# Patient Record
Sex: FEMALE | Race: WHITE | Employment: OTHER | URBAN - METROPOLITAN AREA
[De-identification: names, ages, dates, MRNs, and addresses within clinical notes are randomized per-mention and may not be internally consistent; named-entity substitution may affect disease eponyms.]

---

## 2017-02-06 PROBLEM — J44.89 ASTHMA WITH COPD (CHRONIC OBSTRUCTIVE PULMONARY DISEASE): Status: ACTIVE | Noted: 2017-02-06

## 2017-02-06 PROBLEM — J44.9 ASTHMA WITH COPD (CHRONIC OBSTRUCTIVE PULMONARY DISEASE) (HCC): Status: ACTIVE | Noted: 2017-02-06

## 2018-05-14 ENCOUNTER — OFFICE VISIT (OUTPATIENT)
Dept: FAMILY MEDICINE CLINIC | Age: 58
End: 2018-05-14
Payer: COMMERCIAL

## 2018-05-14 VITALS
BODY MASS INDEX: 35.26 KG/M2 | DIASTOLIC BLOOD PRESSURE: 61 MMHG | OXYGEN SATURATION: 98 % | SYSTOLIC BLOOD PRESSURE: 121 MMHG | HEART RATE: 57 BPM | WEIGHT: 199 LBS | HEIGHT: 63 IN | RESPIRATION RATE: 18 BRPM

## 2018-05-14 DIAGNOSIS — H91.90 HEARING LOSS, UNSPECIFIED HEARING LOSS TYPE, UNSPECIFIED LATERALITY: ICD-10-CM

## 2018-05-14 DIAGNOSIS — M54.41 ACUTE BILATERAL LOW BACK PAIN WITH BILATERAL SCIATICA: Primary | ICD-10-CM

## 2018-05-14 DIAGNOSIS — F40.298 FEAR OF NEEDLES: ICD-10-CM

## 2018-05-14 DIAGNOSIS — M62.830 BACK SPASM: ICD-10-CM

## 2018-05-14 DIAGNOSIS — M54.42 ACUTE BILATERAL LOW BACK PAIN WITH BILATERAL SCIATICA: Primary | ICD-10-CM

## 2018-05-14 PROCEDURE — G8417 CALC BMI ABV UP PARAM F/U: HCPCS | Performed by: FAMILY MEDICINE

## 2018-05-14 PROCEDURE — 99213 OFFICE O/P EST LOW 20 MIN: CPT | Performed by: FAMILY MEDICINE

## 2018-05-14 PROCEDURE — G8427 DOCREV CUR MEDS BY ELIG CLIN: HCPCS | Performed by: FAMILY MEDICINE

## 2018-05-14 PROCEDURE — 3017F COLORECTAL CA SCREEN DOC REV: CPT | Performed by: FAMILY MEDICINE

## 2018-05-14 PROCEDURE — 4004F PT TOBACCO SCREEN RCVD TLK: CPT | Performed by: FAMILY MEDICINE

## 2018-05-14 RX ORDER — BACLOFEN 20 MG/1
20 TABLET ORAL NIGHTLY PRN
Qty: 45 TABLET | Refills: 0 | Status: SHIPPED | OUTPATIENT
Start: 2018-05-14 | End: 2018-05-14 | Stop reason: SDUPTHER

## 2018-05-14 RX ORDER — BACLOFEN 20 MG/1
TABLET ORAL
Qty: 90 TABLET | Refills: 1 | Status: SHIPPED | OUTPATIENT
Start: 2018-05-14 | End: 2019-02-15 | Stop reason: SDUPTHER

## 2018-05-14 ASSESSMENT — ENCOUNTER SYMPTOMS
SINUS PRESSURE: 0
WHEEZING: 0
SHORTNESS OF BREATH: 0
ABDOMINAL PAIN: 0

## 2018-06-11 ENCOUNTER — HOSPITAL ENCOUNTER (OUTPATIENT)
Dept: GENERAL RADIOLOGY | Age: 58
Discharge: HOME | End: 2018-06-13
Payer: COMMERCIAL

## 2018-06-11 ENCOUNTER — HOSPITAL ENCOUNTER (OUTPATIENT)
Dept: AUDIOLOGY | Age: 58
Discharge: HOME | End: 2018-06-11
Payer: COMMERCIAL

## 2018-06-11 ENCOUNTER — HOSPITAL ENCOUNTER (OUTPATIENT)
Age: 58
Discharge: HOME | End: 2018-06-13
Payer: COMMERCIAL

## 2018-06-11 DIAGNOSIS — M54.41 ACUTE BILATERAL LOW BACK PAIN WITH BILATERAL SCIATICA: ICD-10-CM

## 2018-06-11 DIAGNOSIS — M54.42 ACUTE BILATERAL LOW BACK PAIN WITH BILATERAL SCIATICA: ICD-10-CM

## 2018-06-11 DIAGNOSIS — M62.830 BACK SPASM: ICD-10-CM

## 2018-06-11 PROCEDURE — 72110 X-RAY EXAM L-2 SPINE 4/>VWS: CPT

## 2018-06-11 PROCEDURE — 92557 COMPREHENSIVE HEARING TEST: CPT | Performed by: AUDIOLOGIST

## 2018-06-11 PROCEDURE — 92567 TYMPANOMETRY: CPT | Performed by: AUDIOLOGIST

## 2018-06-12 ENCOUNTER — TELEPHONE (OUTPATIENT)
Dept: FAMILY MEDICINE CLINIC | Age: 58
End: 2018-06-12

## 2018-06-25 ENCOUNTER — OFFICE VISIT (OUTPATIENT)
Dept: FAMILY MEDICINE CLINIC | Age: 58
End: 2018-06-25
Payer: COMMERCIAL

## 2018-06-25 VITALS
OXYGEN SATURATION: 98 % | HEIGHT: 63 IN | WEIGHT: 199 LBS | HEART RATE: 76 BPM | SYSTOLIC BLOOD PRESSURE: 110 MMHG | BODY MASS INDEX: 35.26 KG/M2 | RESPIRATION RATE: 18 BRPM | DIASTOLIC BLOOD PRESSURE: 70 MMHG

## 2018-06-25 DIAGNOSIS — J44.9 ASTHMA WITH COPD (CHRONIC OBSTRUCTIVE PULMONARY DISEASE) (HCC): ICD-10-CM

## 2018-06-25 DIAGNOSIS — J01.00 ACUTE NON-RECURRENT MAXILLARY SINUSITIS: ICD-10-CM

## 2018-06-25 DIAGNOSIS — M47.26 OSTEOARTHRITIS OF SPINE WITH RADICULOPATHY, LUMBAR REGION: Primary | ICD-10-CM

## 2018-06-25 DIAGNOSIS — F40.298 FEAR OF NEEDLES: ICD-10-CM

## 2018-06-25 PROCEDURE — G8417 CALC BMI ABV UP PARAM F/U: HCPCS | Performed by: FAMILY MEDICINE

## 2018-06-25 PROCEDURE — 99214 OFFICE O/P EST MOD 30 MIN: CPT | Performed by: FAMILY MEDICINE

## 2018-06-25 PROCEDURE — 4004F PT TOBACCO SCREEN RCVD TLK: CPT | Performed by: FAMILY MEDICINE

## 2018-06-25 PROCEDURE — G8427 DOCREV CUR MEDS BY ELIG CLIN: HCPCS | Performed by: FAMILY MEDICINE

## 2018-06-25 PROCEDURE — G8926 SPIRO NO PERF OR DOC: HCPCS | Performed by: FAMILY MEDICINE

## 2018-06-25 PROCEDURE — 3023F SPIROM DOC REV: CPT | Performed by: FAMILY MEDICINE

## 2018-06-25 PROCEDURE — 3017F COLORECTAL CA SCREEN DOC REV: CPT | Performed by: FAMILY MEDICINE

## 2018-06-25 RX ORDER — AMOXICILLIN AND CLAVULANATE POTASSIUM 875; 125 MG/1; MG/1
1 TABLET, FILM COATED ORAL 2 TIMES DAILY WITH MEALS
Qty: 20 TABLET | Refills: 0 | Status: SHIPPED | OUTPATIENT
Start: 2018-06-25 | End: 2018-07-05

## 2018-06-25 ASSESSMENT — PATIENT HEALTH QUESTIONNAIRE - PHQ9
1. LITTLE INTEREST OR PLEASURE IN DOING THINGS: 0
2. FEELING DOWN, DEPRESSED OR HOPELESS: 0
SUM OF ALL RESPONSES TO PHQ QUESTIONS 1-9: 0
SUM OF ALL RESPONSES TO PHQ9 QUESTIONS 1 & 2: 0

## 2018-06-25 ASSESSMENT — ENCOUNTER SYMPTOMS
WHEEZING: 0
SHORTNESS OF BREATH: 0
SINUS PRESSURE: 0
ABDOMINAL PAIN: 0

## 2018-07-17 ENCOUNTER — OFFICE VISIT (OUTPATIENT)
Dept: FAMILY MEDICINE CLINIC | Age: 58
End: 2018-07-17
Payer: COMMERCIAL

## 2018-07-17 VITALS
HEART RATE: 80 BPM | WEIGHT: 204 LBS | SYSTOLIC BLOOD PRESSURE: 112 MMHG | RESPIRATION RATE: 18 BRPM | BODY MASS INDEX: 34.83 KG/M2 | DIASTOLIC BLOOD PRESSURE: 64 MMHG | HEIGHT: 64 IN | OXYGEN SATURATION: 98 %

## 2018-07-17 DIAGNOSIS — M47.816 ARTHRITIS, LUMBAR SPINE: Primary | ICD-10-CM

## 2018-07-17 DIAGNOSIS — K21.00 GASTROESOPHAGEAL REFLUX DISEASE WITH ESOPHAGITIS: ICD-10-CM

## 2018-07-17 PROCEDURE — 99214 OFFICE O/P EST MOD 30 MIN: CPT | Performed by: FAMILY MEDICINE

## 2018-07-17 PROCEDURE — G8417 CALC BMI ABV UP PARAM F/U: HCPCS | Performed by: FAMILY MEDICINE

## 2018-07-17 PROCEDURE — 4004F PT TOBACCO SCREEN RCVD TLK: CPT | Performed by: FAMILY MEDICINE

## 2018-07-17 PROCEDURE — G8427 DOCREV CUR MEDS BY ELIG CLIN: HCPCS | Performed by: FAMILY MEDICINE

## 2018-07-17 PROCEDURE — 3017F COLORECTAL CA SCREEN DOC REV: CPT | Performed by: FAMILY MEDICINE

## 2018-07-17 RX ORDER — IBUPROFEN 600 MG/1
600 TABLET ORAL EVERY 6 HOURS PRN
Qty: 120 TABLET | Refills: 3 | Status: SHIPPED | OUTPATIENT
Start: 2018-07-17 | End: 2019-10-23 | Stop reason: SDUPTHER

## 2018-07-17 ASSESSMENT — ENCOUNTER SYMPTOMS: BACK PAIN: 1

## 2018-10-03 ENCOUNTER — TELEPHONE (OUTPATIENT)
Dept: FAMILY MEDICINE CLINIC | Age: 58
End: 2018-10-03

## 2019-02-15 DIAGNOSIS — M62.830 BACK SPASM: ICD-10-CM

## 2019-02-15 RX ORDER — BACLOFEN 20 MG/1
TABLET ORAL
Qty: 90 TABLET | Refills: 0 | Status: SHIPPED | OUTPATIENT
Start: 2019-02-15 | End: 2019-05-31 | Stop reason: SDUPTHER

## 2019-03-05 ENCOUNTER — OFFICE VISIT (OUTPATIENT)
Dept: FAMILY MEDICINE CLINIC | Age: 59
End: 2019-03-05
Payer: COMMERCIAL

## 2019-03-05 VITALS
BODY MASS INDEX: 34.21 KG/M2 | WEIGHT: 200.4 LBS | HEIGHT: 64 IN | RESPIRATION RATE: 18 BRPM | DIASTOLIC BLOOD PRESSURE: 63 MMHG | SYSTOLIC BLOOD PRESSURE: 120 MMHG | HEART RATE: 63 BPM | OXYGEN SATURATION: 98 %

## 2019-03-05 DIAGNOSIS — M75.42 SHOULDER IMPINGEMENT SYNDROME, LEFT: Primary | ICD-10-CM

## 2019-03-05 DIAGNOSIS — J44.9 ASTHMA WITH COPD (CHRONIC OBSTRUCTIVE PULMONARY DISEASE) (HCC): ICD-10-CM

## 2019-03-05 DIAGNOSIS — K21.00 GASTROESOPHAGEAL REFLUX DISEASE WITH ESOPHAGITIS: ICD-10-CM

## 2019-03-05 PROCEDURE — 3017F COLORECTAL CA SCREEN DOC REV: CPT | Performed by: FAMILY MEDICINE

## 2019-03-05 PROCEDURE — 4004F PT TOBACCO SCREEN RCVD TLK: CPT | Performed by: FAMILY MEDICINE

## 2019-03-05 PROCEDURE — G8417 CALC BMI ABV UP PARAM F/U: HCPCS | Performed by: FAMILY MEDICINE

## 2019-03-05 PROCEDURE — G8926 SPIRO NO PERF OR DOC: HCPCS | Performed by: FAMILY MEDICINE

## 2019-03-05 PROCEDURE — G8484 FLU IMMUNIZE NO ADMIN: HCPCS | Performed by: FAMILY MEDICINE

## 2019-03-05 PROCEDURE — G8427 DOCREV CUR MEDS BY ELIG CLIN: HCPCS | Performed by: FAMILY MEDICINE

## 2019-03-05 PROCEDURE — 3023F SPIROM DOC REV: CPT | Performed by: FAMILY MEDICINE

## 2019-03-05 PROCEDURE — 99213 OFFICE O/P EST LOW 20 MIN: CPT | Performed by: FAMILY MEDICINE

## 2019-03-05 RX ORDER — RANITIDINE 150 MG/1
150 TABLET ORAL 2 TIMES DAILY
Qty: 180 TABLET | Refills: 3 | Status: SHIPPED
Start: 2019-03-05 | End: 2020-06-19

## 2019-03-05 RX ORDER — ALBUTEROL SULFATE 90 UG/1
2 AEROSOL, METERED RESPIRATORY (INHALATION) EVERY 6 HOURS PRN
Qty: 3 INHALER | Refills: 3 | Status: SHIPPED | OUTPATIENT
Start: 2019-03-05 | End: 2020-01-06 | Stop reason: SDUPTHER

## 2019-03-05 ASSESSMENT — ENCOUNTER SYMPTOMS
SHORTNESS OF BREATH: 0
NAUSEA: 0
VOMITING: 0
COUGH: 0
CONSTIPATION: 0
ABDOMINAL PAIN: 0
SORE THROAT: 0
DIARRHEA: 0

## 2019-03-20 ENCOUNTER — HOSPITAL ENCOUNTER (OUTPATIENT)
Dept: PHYSICAL THERAPY | Age: 59
Setting detail: THERAPIES SERIES
Discharge: HOME | End: 2019-03-20
Payer: COMMERCIAL

## 2019-03-20 PROCEDURE — 97110 THERAPEUTIC EXERCISES: CPT | Performed by: PHYSICAL THERAPIST

## 2019-03-20 PROCEDURE — 97161 PT EVAL LOW COMPLEX 20 MIN: CPT | Performed by: PHYSICAL THERAPIST

## 2019-03-20 ASSESSMENT — PAIN DESCRIPTION - PROGRESSION: CLINICAL_PROGRESSION: GRADUALLY IMPROVING

## 2019-03-20 ASSESSMENT — PAIN DESCRIPTION - DESCRIPTORS: DESCRIPTORS: SHARP

## 2019-03-20 ASSESSMENT — PAIN DESCRIPTION - LOCATION: LOCATION: SHOULDER

## 2019-03-20 ASSESSMENT — PAIN DESCRIPTION - ORIENTATION: ORIENTATION: LEFT

## 2019-03-20 ASSESSMENT — PAIN - FUNCTIONAL ASSESSMENT: PAIN_FUNCTIONAL_ASSESSMENT: PREVENTS OR INTERFERES WITH MANY ACTIVE NOT PASSIVE ACTIVITIES

## 2019-03-20 ASSESSMENT — PAIN DESCRIPTION - PAIN TYPE: TYPE: ACUTE PAIN

## 2019-03-20 ASSESSMENT — PAIN DESCRIPTION - FREQUENCY: FREQUENCY: INTERMITTENT

## 2019-03-27 ENCOUNTER — HOSPITAL ENCOUNTER (OUTPATIENT)
Dept: PHYSICAL THERAPY | Age: 59
Setting detail: THERAPIES SERIES
Discharge: HOME | End: 2019-03-27
Payer: COMMERCIAL

## 2019-04-29 ENCOUNTER — OFFICE VISIT (OUTPATIENT)
Dept: FAMILY MEDICINE CLINIC | Age: 59
End: 2019-04-29
Payer: COMMERCIAL

## 2019-04-29 VITALS
HEART RATE: 88 BPM | OXYGEN SATURATION: 98 % | BODY MASS INDEX: 33.63 KG/M2 | HEIGHT: 64 IN | WEIGHT: 197 LBS | RESPIRATION RATE: 18 BRPM | SYSTOLIC BLOOD PRESSURE: 154 MMHG | DIASTOLIC BLOOD PRESSURE: 86 MMHG

## 2019-04-29 DIAGNOSIS — J44.9 ASTHMA WITH COPD (CHRONIC OBSTRUCTIVE PULMONARY DISEASE) (HCC): ICD-10-CM

## 2019-04-29 DIAGNOSIS — M54.9 TENDERNESS OVER SPINE: ICD-10-CM

## 2019-04-29 DIAGNOSIS — M75.42 SHOULDER IMPINGEMENT SYNDROME, LEFT: ICD-10-CM

## 2019-04-29 DIAGNOSIS — M47.26 OSTEOARTHRITIS OF SPINE WITH RADICULOPATHY, LUMBAR REGION: Primary | ICD-10-CM

## 2019-04-29 PROCEDURE — 99213 OFFICE O/P EST LOW 20 MIN: CPT | Performed by: FAMILY MEDICINE

## 2019-04-29 PROCEDURE — 4004F PT TOBACCO SCREEN RCVD TLK: CPT | Performed by: FAMILY MEDICINE

## 2019-04-29 PROCEDURE — G8427 DOCREV CUR MEDS BY ELIG CLIN: HCPCS | Performed by: FAMILY MEDICINE

## 2019-04-29 PROCEDURE — G8926 SPIRO NO PERF OR DOC: HCPCS | Performed by: FAMILY MEDICINE

## 2019-04-29 PROCEDURE — 3023F SPIROM DOC REV: CPT | Performed by: FAMILY MEDICINE

## 2019-04-29 PROCEDURE — 3017F COLORECTAL CA SCREEN DOC REV: CPT | Performed by: FAMILY MEDICINE

## 2019-04-29 PROCEDURE — G8417 CALC BMI ABV UP PARAM F/U: HCPCS | Performed by: FAMILY MEDICINE

## 2019-04-29 RX ORDER — ALBUTEROL SULFATE 2.5 MG/3ML
2.5 SOLUTION RESPIRATORY (INHALATION) EVERY 6 HOURS PRN
Qty: 120 EACH | Refills: 3 | Status: SHIPPED
Start: 2019-04-29 | End: 2021-09-27 | Stop reason: SDUPTHER

## 2019-04-29 SDOH — HEALTH STABILITY: MENTAL HEALTH: HOW OFTEN DO YOU HAVE A DRINK CONTAINING ALCOHOL?: NEVER

## 2019-04-29 ASSESSMENT — ENCOUNTER SYMPTOMS
NAUSEA: 0
SHORTNESS OF BREATH: 0
COUGH: 0
VOMITING: 0
ABDOMINAL PAIN: 0

## 2019-04-29 NOTE — PROGRESS NOTES
Subjective:    Temo Batres is here today for a follow up for right shoulder pain. The PT has helped it. She fell from a recliner a couple of weeks ago and she sort of did a split and landed somehow on the left side. She is gradually improving. She is complaining of pain in the thoracic and lumbar area. ROS: Otherwise negative    Patient Active Problem List   Diagnosis    Goiter    Depression    Fear of needles    Environmental allergies    Insomnia    Back pain    Asthma with COPD (chronic obstructive pulmonary disease) ([facility] [address]       Past medical, surgical, family and social history were reviewed, non-contributory, and unchanged unless otherwise stated. Objective:    BP (!) 154/86   Pulse 88   Resp 18   Ht 5' 4\" (1.626 m)   Wt 197 lb (89.4 kg)   LMP  (LMP Unknown)   SpO2 98%   Breastfeeding? No   BMI 33.81 kg/m²     Exam is as noted by resident with the following changes, additions or corrections:      Assessment/Plan:        Temo Batres was seen today for follow-up, shoulder pain and fall. Diagnoses and all orders for this visit:    Osteoarthritis of spine with radiculopathy, lumbar region  -     Handicap Placard MISC; by Does not apply route    Shoulder impingement syndrome, left    Tenderness over spine  -     XR LUMBAR SPINE (2-3 VIEWS); Future  -     XR THORACIC SPINE (2 VIEWS); Future    Asthma with COPD (chronic obstructive pulmonary disease) ([facility])  -     albuterol (PROVENTIL) (2.5 MG/3ML) 0.083% nebulizer solution; Take 3 mLs by nebulization every 6 hours as needed for Wheezing  -     tiotropium (SPIRIVA HANDIHALER) 18 MCG inhalation capsule; Inhale 1 capsule into the lungs daily  -     Handicap Placard MISC; by Does not apply route           Attending Physician Statement    I have reviewed the chart, including any radiology or labs. I have discussed the case, including pertinent history and exam findings with the resident.   I agree with the assessment, plan and orders as documented by the

## 2019-04-29 NOTE — PROGRESS NOTES
4/30/2019    Leroy Thao is a 60 y.o. femalehere for: R. Shoulder pain follow up    HPI:    L. Shoulder pain- was here march 5, 2019 for left shoulder pain. Went to one PT session and had been doing those learned exercises at home. The pain has improved. No numbness or tingling. Fall- fell out of her recliner about a week ago when trying to close the recliner with her feet. She landed on her left side and in a leg split. Did not hit head or LOC. She feels sore all over. She has been taking ibuprofen but that did not help much. Currently her left shoulder, left ribs, and left side of back are sore. She is able to ambulate fine. No bruising. BP Readings from Last 3 Encounters:   04/29/19 (!) 154/86   03/05/19 120/63   07/17/18 112/64     Current Outpatient Medications   Medication Sig Dispense Refill    albuterol (PROVENTIL) (2.5 MG/3ML) 0.083% nebulizer solution Take 3 mLs by nebulization every 6 hours as needed for Wheezing 120 each 3    tiotropium (SPIRIVA HANDIHALER) 18 MCG inhalation capsule Inhale 1 capsule into the lungs daily 30 capsule 3    Handicap Placard MISC by Does not apply route 1 each 0    ranitidine (ZANTAC) 150 MG tablet Take 1 tablet by mouth 2 times daily 180 tablet 3    albuterol sulfate HFA (PROVENTIL HFA) 108 (90 Base) MCG/ACT inhaler Inhale 2 puffs into the lungs every 6 hours as needed for Wheezing 3 Inhaler 3    baclofen (LIORESAL) 20 MG tablet TAKE 1 TABLET BY MOUTH EVERY NIGHT AS NEEDED FOR BACK PAIN (Patient taking differently: 10 mg 3 times daily TAKE 1 TABLET BY MOUTH EVERY NIGHT AS NEEDED FOR BACK PAIN) 90 tablet 0    ibuprofen (ADVIL;MOTRIN) 600 MG tablet Take 1 tablet by mouth every 6 hours as needed for Pain 120 tablet 3    loratadine (CLARITIN) 10 MG capsule Take 10 mg by mouth daily       No current facility-administered medications for this visit.        Allergies   Allergen Reactions    Flonase [Fluticasone Propionate] Other (See Comments) sensation. 5/5 motor. Chest wall- no tenderness, no ecchymosis. Spinal tenderness along various spots over thoracic and lumbar vertebrae. Neurological: She is alert and oriented to person, place, and time. Skin: Skin is warm and dry. Assessment/Plan:  1. Shoulder impingement syndrome, left  -improving and is doing home exercises when she gets time. 2. Osteoarthritis of spine with radiculopathy, lumbar region  -patient needs handicap placard  - Handicap Placard MISC; by Does not apply route  Dispense: 1 each; Refill: 0    3. Tenderness over spine  -has some spinal tenderness. No saddle anesthesia or new incontinence problems. Concern for perhaps vertebral compression fractures from fall, especially with spinal tenderness. Discussed getting films.   - XR LUMBAR SPINE (2-3 VIEWS); Future  - XR THORACIC SPINE (2 VIEWS); Future    4. Asthma with COPD (chronic obstructive pulmonary disease) ([facility])  -stable, needs refills. - albuterol (PROVENTIL) (2.5 MG/3ML) 0.083% nebulizer solution; Take 3 mLs by nebulization every 6 hours as needed for Wheezing  Dispense: 120 each; Refill: 3  - tiotropium (SPIRIVA HANDIHALER) 18 MCG inhalation capsule; Inhale 1 capsule into the lungs daily  Dispense: 30 capsule; Refill: 3  - Handicap Placard MISC; by Does not apply route  Dispense: 1 each; Refill: 0    Follow up: 1 month    Patient agrees with the above stated plan.     Yesika Sandhu MD  PGY-2 Family Medicine

## 2019-05-06 ENCOUNTER — HOSPITAL ENCOUNTER (OUTPATIENT)
Dept: GENERAL RADIOLOGY | Age: 59
Discharge: HOME | End: 2019-05-08
Payer: COMMERCIAL

## 2019-05-06 ENCOUNTER — HOSPITAL ENCOUNTER (OUTPATIENT)
Age: 59
Discharge: HOME | End: 2019-05-08
Payer: COMMERCIAL

## 2019-05-06 DIAGNOSIS — M54.9 TENDERNESS OVER SPINE: ICD-10-CM

## 2019-05-06 PROCEDURE — 72070 X-RAY EXAM THORAC SPINE 2VWS: CPT

## 2019-05-06 PROCEDURE — 72100 X-RAY EXAM L-S SPINE 2/3 VWS: CPT

## 2019-05-31 DIAGNOSIS — M62.830 BACK SPASM: ICD-10-CM

## 2019-05-31 RX ORDER — BACLOFEN 20 MG/1
TABLET ORAL
Qty: 90 TABLET | Refills: 0 | Status: SHIPPED | OUTPATIENT
Start: 2019-05-31 | End: 2020-01-06 | Stop reason: SDUPTHER

## 2019-06-11 ENCOUNTER — OFFICE VISIT (OUTPATIENT)
Dept: FAMILY MEDICINE CLINIC | Age: 59
End: 2019-06-11
Payer: COMMERCIAL

## 2019-06-11 VITALS
HEART RATE: 70 BPM | WEIGHT: 193 LBS | HEIGHT: 64 IN | DIASTOLIC BLOOD PRESSURE: 61 MMHG | BODY MASS INDEX: 32.95 KG/M2 | OXYGEN SATURATION: 97 % | SYSTOLIC BLOOD PRESSURE: 122 MMHG | RESPIRATION RATE: 18 BRPM

## 2019-06-11 DIAGNOSIS — M85.851 OSTEOPENIA OF NECKS OF BOTH FEMURS: Primary | ICD-10-CM

## 2019-06-11 DIAGNOSIS — Z91.09 ENVIRONMENTAL ALLERGIES: ICD-10-CM

## 2019-06-11 DIAGNOSIS — M85.852 OSTEOPENIA OF NECKS OF BOTH FEMURS: Primary | ICD-10-CM

## 2019-06-11 PROCEDURE — G8427 DOCREV CUR MEDS BY ELIG CLIN: HCPCS | Performed by: FAMILY MEDICINE

## 2019-06-11 PROCEDURE — 99213 OFFICE O/P EST LOW 20 MIN: CPT | Performed by: FAMILY MEDICINE

## 2019-06-11 PROCEDURE — 4004F PT TOBACCO SCREEN RCVD TLK: CPT | Performed by: FAMILY MEDICINE

## 2019-06-11 PROCEDURE — G8417 CALC BMI ABV UP PARAM F/U: HCPCS | Performed by: FAMILY MEDICINE

## 2019-06-11 PROCEDURE — 3017F COLORECTAL CA SCREEN DOC REV: CPT | Performed by: FAMILY MEDICINE

## 2019-06-11 RX ORDER — CETIRIZINE HYDROCHLORIDE 10 MG/1
10 TABLET ORAL DAILY
Qty: 90 TABLET | Refills: 3 | Status: SHIPPED
Start: 2019-06-11 | End: 2021-01-15

## 2019-06-11 ASSESSMENT — ENCOUNTER SYMPTOMS
VOMITING: 0
ABDOMINAL PAIN: 0
SHORTNESS OF BREATH: 0
COUGH: 0
NAUSEA: 0

## 2019-06-11 NOTE — PROGRESS NOTES
Past Medical & Surgical History:      Diagnosis Date    Arthritis     Asthma     Grave's disease     Osteopenia     Thyroid disease      Past Surgical History:   Procedure Laterality Date    TONSILLECTOMY         Family History:      Problem Relation Age of Onset    Stroke Mother 68    Emphysema Mother     Heart Attack Father     Stroke Father 80    Rheumatologic Disease Father     COPD Brother     Osteoarthritis Brother        Social History:  Social History     Tobacco Use    Smoking status: Current Every Day Smoker     Packs/day: 1.00     Years: 44.00     Pack years: 44.00     Types: Cigarettes    Smokeless tobacco: Never Used   Substance Use Topics    Alcohol use: No     Alcohol/week: 0.0 oz     Frequency: Never     Binge frequency: Never         There is no immunization history on file for this patient. Review of Systems   Constitutional: Negative for chills and fever. Respiratory: Negative for cough and shortness of breath. Cardiovascular: Negative for chest pain, palpitations and leg swelling. Gastrointestinal: Negative for abdominal pain, nausea and vomiting. Genitourinary: Negative for dysuria. Neurological: Negative for dizziness and headaches. VS:  /61   Pulse 70   Resp 18   Ht 5' 4\" (1.626 m)   Wt 193 lb (87.5 kg)   LMP  (LMP Unknown)   SpO2 97%   BMI 33.13 kg/m²     Physical Exam   Constitutional: She is oriented to person, place, and time. She appears well-developed and well-nourished. HENT:   Head: Normocephalic and atraumatic. Cardiovascular: Normal rate and regular rhythm. No murmur heard. Pulmonary/Chest: Effort normal and breath sounds normal. No respiratory distress. She has no wheezes. Musculoskeletal: Normal range of motion. Minimal spinal tenderness along lumbar area. Full ROM. Neurological: She is alert and oriented to person, place, and time. Skin: Skin is warm and dry. Assessment/Plan:  1.  Osteopenia of necks of both femurs  -recommended DEXA scan as she has evidence of degenerative disc disease and has a history of osteopenia as seen by DEXA back in 2012. Will get repeat DEXA and determine if she is candidate for bisphosphonate therapy. - DEXA BONE DENSITY AXIAL SKELETON; Future    2. Environmental allergies  -claritin no longer helps. Trial of zyrtec. - cetirizine (ZYRTEC) 10 MG tablet; Take 1 tablet by mouth daily  Dispense: 90 tablet; Refill: 3    Follow up: 6 months    Patient agrees with the above stated plan. Milka Noelmichelle received counseling on the following healthy behaviors: nutrition, medication adherence and tobacco cessation.     Troy Oviedo MD  PGY-2 Family Medicine

## 2019-06-11 NOTE — PROGRESS NOTES
[facility]  Precepting Note    Subjective:  1 mos fu for back pain    1 month ago had back films for back pain and after a fall from chair. xrays without acute changes, + chronic changes. dexa 2012 osteopenia femoral necks. Patient contemplative of f/u. Back pain improved. ADLs good. Good days and bad days. Seasonal allergies. Claritin 10 mg years, not working well now. Sneezing and watery eyes. More often with outdoor exposure. Asthma Hx  Requesting air conditioner Rx. Declines labs today. Counseled. Declines HM options today. Counseled. ROS otherwise negative     Past medical, surgical, family and social history were reviewed, non-contributory, and unchanged unless otherwise stated. Objective:    /61   Pulse 70   Resp 18   Ht 5' 4\" (1.626 m)   Wt 193 lb (87.5 kg)   LMP  (LMP Unknown)   SpO2 97%   BMI 33.13 kg/m²     Exam is as noted by resident with the following changes, additions or corrections:    General:  NAD; alert & oriented x 3   Heart:  RRR, no murmurs, gallops, or rubs. Lungs:  CTA bilaterally, no wheeze, rales or rhonchi  Abd: bowel sounds present, nontender, nondistended, no masses  Extrem:  No clubbing, cyanosis, or edema  Mild spinal ttp in lumbar area. No thoracic ttp. Assessment/Plan:  Back pain - greatly improved. Continue symptomatic Tx as needed. Seasonal allergies  Switch to Zyrtec. Attending Physician Statement  I have reviewed the chart, including any radiology or labs. I have discussed the case, including pertinent history and exam findings with the resident. I agree with the assessment, plan and orders as documented by the resident. Please refer to the resident note for additional information.       Electronically signed by Noelle Christianson MD on 6/11/2019 at 9:20 AM

## 2019-08-29 DIAGNOSIS — K21.00 GASTROESOPHAGEAL REFLUX DISEASE WITH ESOPHAGITIS: ICD-10-CM

## 2019-08-29 RX ORDER — IBUPROFEN 600 MG/1
600 TABLET ORAL EVERY 6 HOURS PRN
Qty: 120 TABLET | Refills: 3 | OUTPATIENT
Start: 2019-08-29

## 2019-10-23 ENCOUNTER — APPOINTMENT (OUTPATIENT)
Dept: GENERAL RADIOLOGY | Age: 59
End: 2019-10-23
Payer: COMMERCIAL

## 2019-10-23 ENCOUNTER — HOSPITAL ENCOUNTER (EMERGENCY)
Age: 59
Discharge: HOME | End: 2019-10-23
Attending: EMERGENCY MEDICINE
Payer: COMMERCIAL

## 2019-10-23 VITALS
SYSTOLIC BLOOD PRESSURE: 132 MMHG | TEMPERATURE: 98 F | BODY MASS INDEX: 32.44 KG/M2 | WEIGHT: 190 LBS | HEART RATE: 74 BPM | OXYGEN SATURATION: 96 % | HEIGHT: 64 IN | RESPIRATION RATE: 16 BRPM | DIASTOLIC BLOOD PRESSURE: 80 MMHG

## 2019-10-23 DIAGNOSIS — K21.00 GASTROESOPHAGEAL REFLUX DISEASE WITH ESOPHAGITIS: ICD-10-CM

## 2019-10-23 DIAGNOSIS — S93.401A SPRAIN OF RIGHT ANKLE, UNSPECIFIED LIGAMENT, INITIAL ENCOUNTER: Primary | ICD-10-CM

## 2019-10-23 PROCEDURE — 99283 EMERGENCY DEPT VISIT LOW MDM: CPT

## 2019-10-23 PROCEDURE — 73610 X-RAY EXAM OF ANKLE: CPT

## 2019-10-23 RX ORDER — ACETAMINOPHEN 500 MG
1000 TABLET ORAL ONCE
Status: DISCONTINUED | OUTPATIENT
Start: 2019-10-23 | End: 2019-10-23 | Stop reason: HOSPADM

## 2019-10-23 RX ORDER — IBUPROFEN 600 MG/1
600 TABLET ORAL EVERY 6 HOURS PRN
Qty: 30 TABLET | Refills: 0 | Status: SHIPPED | OUTPATIENT
Start: 2019-10-23 | End: 2021-09-27 | Stop reason: SDUPTHER

## 2019-10-23 ASSESSMENT — PAIN DESCRIPTION - ORIENTATION: ORIENTATION: RIGHT

## 2019-10-23 ASSESSMENT — PAIN DESCRIPTION - DESCRIPTORS: DESCRIPTORS: SORE

## 2019-10-23 ASSESSMENT — PAIN DESCRIPTION - PAIN TYPE: TYPE: ACUTE PAIN

## 2019-10-23 ASSESSMENT — PAIN DESCRIPTION - FREQUENCY: FREQUENCY: CONTINUOUS

## 2019-10-23 ASSESSMENT — PAIN DESCRIPTION - LOCATION: LOCATION: ANKLE

## 2019-10-23 ASSESSMENT — PAIN SCALES - GENERAL: PAINLEVEL_OUTOF10: 10

## 2019-12-17 ENCOUNTER — HOSPITAL ENCOUNTER (OUTPATIENT)
Dept: GENERAL RADIOLOGY | Age: 59
Discharge: HOME | End: 2019-12-19
Payer: COMMERCIAL

## 2019-12-17 DIAGNOSIS — M85.851 OSTEOPENIA OF NECKS OF BOTH FEMURS: ICD-10-CM

## 2019-12-17 DIAGNOSIS — M85.852 OSTEOPENIA OF NECKS OF BOTH FEMURS: ICD-10-CM

## 2019-12-17 PROCEDURE — 77080 DXA BONE DENSITY AXIAL: CPT

## 2020-01-06 ENCOUNTER — OFFICE VISIT (OUTPATIENT)
Dept: FAMILY MEDICINE CLINIC | Age: 60
End: 2020-01-06
Payer: COMMERCIAL

## 2020-01-06 VITALS
BODY MASS INDEX: 33.8 KG/M2 | SYSTOLIC BLOOD PRESSURE: 125 MMHG | OXYGEN SATURATION: 97 % | WEIGHT: 198 LBS | RESPIRATION RATE: 18 BRPM | DIASTOLIC BLOOD PRESSURE: 75 MMHG | HEIGHT: 64 IN | HEART RATE: 65 BPM

## 2020-01-06 PROCEDURE — G8427 DOCREV CUR MEDS BY ELIG CLIN: HCPCS | Performed by: FAMILY MEDICINE

## 2020-01-06 PROCEDURE — 3023F SPIROM DOC REV: CPT | Performed by: FAMILY MEDICINE

## 2020-01-06 PROCEDURE — 3017F COLORECTAL CA SCREEN DOC REV: CPT | Performed by: FAMILY MEDICINE

## 2020-01-06 PROCEDURE — G8484 FLU IMMUNIZE NO ADMIN: HCPCS | Performed by: FAMILY MEDICINE

## 2020-01-06 PROCEDURE — 99214 OFFICE O/P EST MOD 30 MIN: CPT | Performed by: FAMILY MEDICINE

## 2020-01-06 PROCEDURE — G8417 CALC BMI ABV UP PARAM F/U: HCPCS | Performed by: FAMILY MEDICINE

## 2020-01-06 PROCEDURE — 4004F PT TOBACCO SCREEN RCVD TLK: CPT | Performed by: FAMILY MEDICINE

## 2020-01-06 PROCEDURE — G8926 SPIRO NO PERF OR DOC: HCPCS | Performed by: FAMILY MEDICINE

## 2020-01-06 RX ORDER — BACLOFEN 20 MG/1
TABLET ORAL
Qty: 90 TABLET | Refills: 0 | Status: SHIPPED
Start: 2020-01-06 | End: 2020-06-19 | Stop reason: SDUPTHER

## 2020-01-06 RX ORDER — ALBUTEROL SULFATE 90 UG/1
2 AEROSOL, METERED RESPIRATORY (INHALATION) EVERY 6 HOURS PRN
Qty: 3 INHALER | Refills: 3 | Status: SHIPPED
Start: 2020-01-06 | End: 2021-01-15

## 2020-01-06 ASSESSMENT — ENCOUNTER SYMPTOMS
SINUS PRESSURE: 1
SHORTNESS OF BREATH: 1
BACK PAIN: 1

## 2020-01-06 NOTE — PROGRESS NOTES
Gets together: None     Attends Faith service: None     Active member of club or organization: None     Attends meetings of clubs or organizations: None     Relationship status: None    Intimate partner violence:     Fear of current or ex partner: None     Emotionally abused: None     Physically abused: None     Forced sexual activity: None   Other Topics Concern    None   Social History Narrative    None       Allergies   Allergen Reactions    Flonase [Fluticasone Propionate] Other (See Comments)     migraine    Prednisolone Anxiety       Current Outpatient Medications on File Prior to Visit   Medication Sig Dispense Refill    ibuprofen (ADVIL;MOTRIN) 600 MG tablet Take 1 tablet by mouth every 6 hours as needed for Pain 30 tablet 0    cetirizine (ZYRTEC) 10 MG tablet Take 1 tablet by mouth daily 90 tablet 3    baclofen (LIORESAL) 20 MG tablet TAKE 1 TABLET BY MOUTH EVERY NIGHT AS NEEDED FOR BACK PAIN 90 tablet 0    albuterol (PROVENTIL) (2.5 MG/3ML) 0.083% nebulizer solution Take 3 mLs by nebulization every 6 hours as needed for Wheezing 120 each 3    Handicap Placard MISC by Does not apply route 1 each 0    ranitidine (ZANTAC) 150 MG tablet Take 1 tablet by mouth 2 times daily (Patient not taking: Reported on 1/6/2020) 180 tablet 3     No current facility-administered medications on file prior to visit. Review of Systems   Constitutional: Negative. HENT: Positive for sinus pressure. Eyes:        Dry eyes and allergic to Restasis   Respiratory: Positive for shortness of breath. She continues to smoke. Usually less than a pack but can go to a pack and a half. Cardiovascular: Positive for chest pain. Chest pain with anxiety. Gastrointestinal:        Occasional abdominal pain due to gallbladder stone. Genitourinary: Negative. Musculoskeletal: Positive for back pain. Has sciatic pain in the left side. Skin: Negative.     Neurological:        Has a history of falling about two times a year. She says that an overactive thyroid made her legs weak. She also has radicular pain due to sciatica into the left leg to the foot. Hematological: Negative. Psychiatric/Behavioral: Negative. Unless otherwise stated in this report or unable to obtain because of the patient's clinical or mental status as evidenced by the medical record, this patients'spositive and negative responses for Review of Systems, constitutional, psych, eyes, ENT, cardiovascular, respiratory, gastrointestinal, neurological, genitourinary, musculoskeletal, integument systems and systems related tothe presenting problem are either stated in the preceding or were not pertinent or were negative for the symptoms and/or complaints related to the medical problem. OBJECTIVE:     Physical Exam  HENT:      Head: Normocephalic. Right Ear: Tympanic membrane and ear canal normal.      Left Ear: Tympanic membrane and ear canal normal.      Nose: Nose normal.      Mouth/Throat:      Mouth: Mucous membranes are moist.      Comments: Multiple carries  Eyes:      General: No scleral icterus. Right eye: No discharge. Left eye: No discharge. Extraocular Movements: Extraocular movements intact. Conjunctiva/sclera: Conjunctivae normal.      Pupils: Pupils are equal, round, and reactive to light. Neck:      Musculoskeletal: Normal range of motion. Cardiovascular:      Rate and Rhythm: Normal rate and regular rhythm. Pulses: Normal pulses. Pulmonary:      Effort: Pulmonary effort is normal.      Breath sounds: Normal breath sounds. Abdominal:      General: Abdomen is flat. Bowel sounds are normal.      Palpations: Abdomen is soft. Musculoskeletal: Normal range of motion. Skin:     General: Skin is warm. Neurological:      Mental Status: She is alert. ASSESSMENT AND PLAN:    Ole Monreal was seen today for back pain.     Diagnoses and all orders for this visit:    Sciatica of left side    Asthma with COPD (chronic obstructive pulmonary disease) ([facility])  -     albuterol sulfate HFA (PROVENTIL HFA) 108 (90 Base) MCG/ACT inhaler; Inhale 2 puffs into the lungs every 6 hours as needed for Wheezing    Osteoarthritis of spine with radiculopathy, lumbar region    Note:   She refuses all blood work and immunizations. Return in about 3 months (around 4/6/2020).     Arcenio Robert MD

## 2020-06-19 RX ORDER — BACLOFEN 20 MG/1
TABLET ORAL
Qty: 90 TABLET | Refills: 0 | Status: SHIPPED
Start: 2020-06-19 | End: 2020-12-28 | Stop reason: SDUPTHER

## 2020-06-19 RX ORDER — FAMOTIDINE 20 MG/1
20 TABLET, FILM COATED ORAL 2 TIMES DAILY
Qty: 60 TABLET | Refills: 3 | Status: SHIPPED
Start: 2020-06-19 | End: 2020-06-20

## 2020-06-19 NOTE — TELEPHONE ENCOUNTER
Last Appointment   1/6/2020  Next Appointment  Visit date not found    - pt still too nervous to come into office - she would like refills to last maybe 2-3 more months until she is more comfortable to come in.     - patient also wants to know if she should be taking the Zantac? Her pharmacist told her that she is okay, but she has heard about the recall and is very nervous about it. If you want to change medication - she does not want to be on Omeprazole as she does not like the side effects listed. - spoke to patient about switching physicians - she would like to see one of the new oncoming residents as she would like someone as long as possible.

## 2020-06-19 NOTE — TELEPHONE ENCOUNTER
Refilled medication. Instead of zantac, she can take pepcid which is in the same class as this medication. I have sent that medication to her pharmacy as well.

## 2020-06-20 RX ORDER — FAMOTIDINE 20 MG/1
TABLET, FILM COATED ORAL
Qty: 180 TABLET | Refills: 0 | Status: SHIPPED
Start: 2020-06-20 | End: 2020-12-28 | Stop reason: SDUPTHER

## 2020-07-14 ENCOUNTER — TELEPHONE (OUTPATIENT)
Dept: FAMILY MEDICINE CLINIC | Age: 60
End: 2020-07-14

## 2020-07-14 NOTE — TELEPHONE ENCOUNTER
The patient is transferring from Dr. Aren Chance to Dr. Marielos Winn and received a summons for jury duty. The patient states that she cannot sit that long to be on a jury. Please fax an excuse to [phone].

## 2020-07-14 NOTE — LETTER
[address] [address]  Phone: [phone]  Fax: [phone]    Johana Lord MD        July 14, 2020     Patient: Teodora Nassar   YOB: 1960   Date of Visit: 7/14/2020       To Whom It May Concern: It is my medical opinion that Sindy Esquivel is not a candidate to sit for long periods of time that would be required of a juror due to her medical conditions. If you have any questions or concerns, please don't hesitate to call.     Sincerely,        Johana Lord MD

## 2020-12-28 RX ORDER — BACLOFEN 20 MG/1
TABLET ORAL
Qty: 90 TABLET | Refills: 0 | Status: SHIPPED
Start: 2020-12-28 | End: 2021-03-26 | Stop reason: SDUPTHER

## 2020-12-28 RX ORDER — FAMOTIDINE 20 MG/1
TABLET, FILM COATED ORAL
Qty: 180 TABLET | Refills: 0 | Status: SHIPPED
Start: 2020-12-28 | End: 2021-10-27 | Stop reason: SDUPTHER

## 2020-12-28 NOTE — TELEPHONE ENCOUNTER
Meds filled but patient needs to be seen. Has not been seen by me ever and last saw Dr. John Benton 1 year ago. Will not fill any further meds if not seen.     Electronically signed by Bradley Walter MD on 12/28/2020 at 4:17 PM

## 2021-01-15 ENCOUNTER — VIRTUAL VISIT (OUTPATIENT)
Dept: FAMILY MEDICINE CLINIC | Age: 61
End: 2021-01-15
Payer: COMMERCIAL

## 2021-01-15 DIAGNOSIS — Z72.0 TOBACCO ABUSE: ICD-10-CM

## 2021-01-15 DIAGNOSIS — F32.A DEPRESSION, UNSPECIFIED DEPRESSION TYPE: ICD-10-CM

## 2021-01-15 DIAGNOSIS — J44.9 ASTHMA WITH COPD (CHRONIC OBSTRUCTIVE PULMONARY DISEASE) (HCC): Primary | ICD-10-CM

## 2021-01-15 PROCEDURE — 99213 OFFICE O/P EST LOW 20 MIN: CPT | Performed by: FAMILY MEDICINE

## 2021-01-15 PROCEDURE — 3017F COLORECTAL CA SCREEN DOC REV: CPT | Performed by: FAMILY MEDICINE

## 2021-01-15 PROCEDURE — G8926 SPIRO NO PERF OR DOC: HCPCS | Performed by: FAMILY MEDICINE

## 2021-01-15 PROCEDURE — 4004F PT TOBACCO SCREEN RCVD TLK: CPT | Performed by: FAMILY MEDICINE

## 2021-01-15 PROCEDURE — 3023F SPIROM DOC REV: CPT | Performed by: FAMILY MEDICINE

## 2021-01-15 PROCEDURE — G8421 BMI NOT CALCULATED: HCPCS | Performed by: FAMILY MEDICINE

## 2021-01-15 PROCEDURE — G8484 FLU IMMUNIZE NO ADMIN: HCPCS | Performed by: FAMILY MEDICINE

## 2021-01-15 PROCEDURE — G8427 DOCREV CUR MEDS BY ELIG CLIN: HCPCS | Performed by: FAMILY MEDICINE

## 2021-01-15 RX ORDER — ALBUTEROL SULFATE 90 UG/1
2 AEROSOL, METERED RESPIRATORY (INHALATION) EVERY 6 HOURS PRN
Qty: 1 INHALER | Refills: 3 | Status: SHIPPED
Start: 2021-01-15 | End: 2021-04-16 | Stop reason: SDUPTHER

## 2021-01-15 SDOH — ECONOMIC STABILITY: FOOD INSECURITY: WITHIN THE PAST 12 MONTHS, YOU WORRIED THAT YOUR FOOD WOULD RUN OUT BEFORE YOU GOT MONEY TO BUY MORE.: NEVER TRUE

## 2021-01-15 SDOH — ECONOMIC STABILITY: INCOME INSECURITY: HOW HARD IS IT FOR YOU TO PAY FOR THE VERY BASICS LIKE FOOD, HOUSING, MEDICAL CARE, AND HEATING?: NOT ASKED

## 2021-01-15 SDOH — ECONOMIC STABILITY: FOOD INSECURITY: WITHIN THE PAST 12 MONTHS, THE FOOD YOU BOUGHT JUST DIDN'T LAST AND YOU DIDN'T HAVE MONEY TO GET MORE.: NEVER TRUE

## 2021-01-15 ASSESSMENT — ENCOUNTER SYMPTOMS
WHEEZING: 0
ABDOMINAL PAIN: 0
DIARRHEA: 0
SORE THROAT: 0
BACK PAIN: 1
SHORTNESS OF BREATH: 0
VOMITING: 0
NAUSEA: 0
COUGH: 0

## 2021-01-15 NOTE — PROGRESS NOTES
[facility] Video Visit Precepting Note    Subjective: This Telehealth visit was performed as two-way, audio-video technology platform. Verbal consent was taken from patient as noted in medical assistant's/nurse's note. COPD  Using rescue infrequently    Mood disorder; on counseling  Hx of tobacco use  Objective:  As noted in resident's note. Assessment/Plan:  COPD  Tobacco use    Continue breathing treatment  Counseled on tobacco use       Attending Physician Statement  I have reviewed the chart, including any radiology or labs. I have discussed the case, including pertinent history with the resident. I agree with the assessment, plan and orders as documented by the resident. Please refer to the resident note for additional information.     Electronically signed by Leigh Mcintosh MD on 1/15/21 at 9:57 AM EST

## 2021-01-15 NOTE — PROGRESS NOTES
1/15/2021    TELEHEALTH EVALUATION -- Audio/Visual (During JZMEZ-29 public health emergency)    HPI:    Aviva Aguirre (:  1960) has requested an audio/video evaluation for the following concern(s):    COPD: At baseline. She recently had a minor URI. Got from [employer], her daughter got tested and was negative for COVID. She uses the nebulizer very rarely, has not used it in 4 months. She uses her rescue occasionally in the mornings, once every 2-3 weeks. Mood: Her counselor recently passed away unfortunately. She was based out of [facility]. She is unsure of her willingness at this time to see a new counselor. She has tried a long list of medications in the past and has not tolerated them well. She feels at her baseline mood although is sad about her passing. She was seeing her for 6 years. Smoking: She has recently increased the amount of smoking due to stress over the pandemic. Not working on quitting. Review of Systems   Constitutional: Negative for chills and fever. HENT: Negative for sore throat. Respiratory: Negative for cough, shortness of breath and wheezing. Cardiovascular: Negative for chest pain, palpitations and leg swelling. Gastrointestinal: Negative for abdominal pain, diarrhea, nausea and vomiting. Genitourinary: Negative for dysuria and hematuria. Musculoskeletal: Positive for back pain (chronic). Skin: Negative for rash. Neurological: Negative for dizziness, light-headedness and numbness. Prior to Visit Medications    Medication Sig Taking?  Authorizing Provider   albuterol sulfate HFA (PROAIR HFA) 108 (90 Base) MCG/ACT inhaler Inhale 2 puffs into the lungs every 6 hours as needed for Wheezing Yes Ej Whiting MD   baclofen (LIORESAL) 20 MG tablet TAKE 1 TABLET BY MOUTH EVERY NIGHT AS NEEDED FOR BACK PAIN Yes Ej Whiting MD   famotidine (PEPCID) 20 MG tablet TAKE 1 TABLET BY MOUTH TWICE DAILY Yes Ej Whiting MD   ibuprofen (ADVIL;MOTRIN) 600 MG tablet Take 1 tablet by mouth every 6 hours as needed for Pain Yes Raymond Rios DO   albuterol (PROVENTIL) (2.5 MG/3ML) 0.083% nebulizer solution Take 3 mLs by nebulization every 6 hours as needed for Wheezing Yes Romy Bahena MD   Handicap Placard MISC by Does not apply route Yes Romy Bahena MD       Social History     Tobacco Use    Smoking status: Current Every Day Smoker     Packs/day: 2.00     Years: 44.00     Pack years: 88.00     Types: Cigarettes    Smokeless tobacco: Never Used   Substance Use Topics    Alcohol use: No     Alcohol/week: 0.0 standard drinks     Frequency: Never     Binge frequency: Never    Drug use: No       PHYSICAL EXAMINATION:  [ INSTRUCTIONS:  \"[x]\" Indicates a positive item  \"[]\" Indicates a negative item  -- DELETE ALL ITEMS NOT EXAMINED]  Vital Signs: (As obtained by patient/caregiver or practitioner observation)    Constitutional: [x] Appears well-developed and well-nourished [x] No apparent distress      [] Abnormal-   Mental status  [x] Alert and awake  [x] Oriented to person/place/time [x]Able to follow commands      Eyes:  EOM    [x]  Normal  [] Abnormal-  Sclera  [x]  Normal  [] Abnormal -         Discharge [x]  None visible  [] Abnormal -    HENT:   [x] Normocephalic, atraumatic.   [] Abnormal     External Ears [x] Normal  [] Abnormal-     Neck: [x] No visualized mass     Pulmonary/Chest: [x] Respiratory effort normal.  [x] No visualized signs of difficulty breathing or respiratory distress        [] Abnormal-      Musculoskeletal:           [x] Normal range of motion of neck        [] Abnormal-       Neurological:        [x] No Facial Asymmetry (Cranial nerve 7 motor function) (limited exam to video visit)          [x] No gaze palsy        [] Abnormal-         Skin:        [x] No significant exanthematous lesions or discoloration noted on facial skin         [] Abnormal-            Psychiatric:       [x] Normal Affect [x] No Hallucinations        [] Abnormal-     Other pertinent observable physical exam findings-     ASSESSMENT/PLAN:    1. Asthma with COPD (chronic obstructive pulmonary disease) ([facility] [address]  Doing well  Use of inhalers remains infrequent  Not on controller at this time  - albuterol sulfate HFA (PROAIR HFA) 108 (90 Base) MCG/ACT inhaler; Inhale 2 puffs into the lungs every 6 hours as needed for Wheezing  Dispense: 1 Inhaler; Refill: 3    2. Depression, unspecified depression type  Counselor recently passed  Planning to establish with new counselor   Not amenable to medication at this time due to previous adverse effects  Has good support system in her home    3. Tobacco abuse  Continuing to smoke  Not willing to quit at this time      Return in about 3 months (around 4/15/2021) for COPD follow up. Verner Lass is a 63 y.o. female being evaluated by a Virtual Visit (video visit) encounter to address concerns as mentioned above. A caregiver was present when appropriate. Due to this being a TeleHealth encounter (During NLFJX-38 public health emergency), evaluation of the following organ systems was limited: Vitals/Constitutional/EENT/Resp/CV/GI//MS/Neuro/Skin/Heme-Lymph-Imm. Pursuant to the emergency declaration under the [facility], [address] [place] [address] authority and the [employer] and [employer] Act, this Virtual Visit was conducted with patient's (and/or legal guardian's) consent, to reduce the patient's risk of exposure to COVID-19 and provide necessary medical care. The patient (and/or legal guardian) has also been advised to contact this office for worsening conditions or problems, and seek emergency medical treatment and/or call 911 if deemed necessary.      Patient identification was verified at the start of the visit: Yes    Total time spent on this encounter: Not billed by time    Services were provided through a video synchronous discussion virtually to substitute for in-person clinic visit. Patient and provider were located at their individual homes. --Berto Martin MD on 1/15/2021 at 9:50 AM    An electronic signature was used to authenticate this note.

## 2021-03-25 DIAGNOSIS — M62.830 BACK SPASM: ICD-10-CM

## 2021-03-26 RX ORDER — BACLOFEN 20 MG/1
TABLET ORAL
Qty: 90 TABLET | Refills: 0 | Status: SHIPPED
Start: 2021-03-26 | End: 2021-07-27 | Stop reason: SDUPTHER

## 2021-04-16 ENCOUNTER — OFFICE VISIT (OUTPATIENT)
Dept: FAMILY MEDICINE CLINIC | Age: 61
End: 2021-04-16
Payer: COMMERCIAL

## 2021-04-16 VITALS
DIASTOLIC BLOOD PRESSURE: 73 MMHG | BODY MASS INDEX: 37.9 KG/M2 | SYSTOLIC BLOOD PRESSURE: 156 MMHG | HEIGHT: 64 IN | HEART RATE: 57 BPM | WEIGHT: 222 LBS | TEMPERATURE: 97.6 F

## 2021-04-16 DIAGNOSIS — M17.11 PRIMARY OSTEOARTHRITIS OF RIGHT KNEE: ICD-10-CM

## 2021-04-16 DIAGNOSIS — J44.9 ASTHMA WITH COPD (CHRONIC OBSTRUCTIVE PULMONARY DISEASE) (HCC): Primary | ICD-10-CM

## 2021-04-16 PROCEDURE — G8417 CALC BMI ABV UP PARAM F/U: HCPCS | Performed by: FAMILY MEDICINE

## 2021-04-16 PROCEDURE — 3023F SPIROM DOC REV: CPT | Performed by: FAMILY MEDICINE

## 2021-04-16 PROCEDURE — G8427 DOCREV CUR MEDS BY ELIG CLIN: HCPCS | Performed by: FAMILY MEDICINE

## 2021-04-16 PROCEDURE — G8926 SPIRO NO PERF OR DOC: HCPCS | Performed by: FAMILY MEDICINE

## 2021-04-16 PROCEDURE — 3017F COLORECTAL CA SCREEN DOC REV: CPT | Performed by: FAMILY MEDICINE

## 2021-04-16 PROCEDURE — 99213 OFFICE O/P EST LOW 20 MIN: CPT | Performed by: FAMILY MEDICINE

## 2021-04-16 PROCEDURE — 4004F PT TOBACCO SCREEN RCVD TLK: CPT | Performed by: FAMILY MEDICINE

## 2021-04-16 RX ORDER — ALBUTEROL SULFATE 90 UG/1
2 AEROSOL, METERED RESPIRATORY (INHALATION) EVERY 6 HOURS PRN
Qty: 3 INHALER | Refills: 3 | Status: SHIPPED
Start: 2021-04-16 | End: 2021-09-27 | Stop reason: SDUPTHER

## 2021-04-16 ASSESSMENT — ENCOUNTER SYMPTOMS
SORE THROAT: 0
NAUSEA: 0
VOMITING: 0
DIARRHEA: 0
WHEEZING: 0
CONSTIPATION: 0
COUGH: 0
SHORTNESS OF BREATH: 0
BACK PAIN: 1
TROUBLE SWALLOWING: 0

## 2021-04-16 NOTE — PROGRESS NOTES
[facility]  Pre[facility]ing Note    Subjective:  COPD: stable, on Albuterol as needed  On Zyrtec and Claritin  Right knee pain x 5 days  More painful with walking      ROS otherwise negative     Past medical, surgical, family and social history were reviewed, non-contributory, and unchanged unless otherwise stated. Objective:    BP (!) 156/73   Pulse 57   Temp 97.6 °F (36.4 °C) (Temporal)   Ht 5' 4\" (1.626 m)   Wt 222 lb (100.7 kg)   LMP  (LMP Unknown)   BMI 38.11 kg/m²     Exam is as noted by resident     Assessment/Plan:  Asthma and COPD  Offered maintenance inhalers, refused  BP elevated: monitor  Right knee pain: declines any treatment at this time  Discussed weight loss  HM: declined  Understands risks of having undiagnosed and untreated conditions     Attending Physician Statement  I have reviewed the chart, including any radiology or labs. I have discussed the case, including pertinent history and exam findings with the resident. I agree with the assessment, plan and orders as documented by the resident. Please refer to the resident note for additional information.       Electronically signed by Kathya Brown MD on 4/16/2021 at 8:55 AM

## 2021-04-16 NOTE — PROGRESS NOTES
4/16/2021    Niki Lopez is a 63 y.o. female here for:    HPI:    COPD: Doing well overall. A bit more sneezing, wheezing and coughing. Due to the spring her grandkids like to bring flowers in which can trigger some episodes. Using inhaler when needed. Used to take Claritin and Zyrtec and these are not providing much relief anymore. Using inhaler 3-4 times per week due to these allergen triggers but prior to spring was ~1 time per week, occasionally less. Knee: Around 4-5 weeks ago she noticed worsening of pain. Started aching and now she is having difficulty ambulating on the leg. She is currently using a wheelchair. Gets popping occasionally but no locking and no grinding sensation. She has no interest in knee injection or replacement at this time. She needs a handicap placard and would like to try using a cane at home    She does not want to do any screenings or blood work at any point. BP Readings from Last 3 Encounters:   04/16/21 (!) 156/73   01/06/20 125/75   10/23/19 132/80     Current Outpatient Medications   Medication Sig Dispense Refill    albuterol sulfate HFA (PROAIR HFA) 108 (90 Base) MCG/ACT inhaler Inhale 2 puffs into the lungs every 6 hours as needed for Wheezing 3 Inhaler 3    Handicap Placard MISC Duration: 1 Year    Dx: Primary Osteoarthritis of Right Knee 1 each 0    baclofen (LIORESAL) 20 MG tablet TAKE 1 TABLET BY MOUTH EVERY NIGHT AS NEEDED FOR BACK PAIN 90 tablet 0    famotidine (PEPCID) 20 MG tablet TAKE 1 TABLET BY MOUTH TWICE DAILY 180 tablet 0    ibuprofen (ADVIL;MOTRIN) 600 MG tablet Take 1 tablet by mouth every 6 hours as needed for Pain 30 tablet 0    albuterol (PROVENTIL) (2.5 MG/3ML) 0.083% nebulizer solution Take 3 mLs by nebulization every 6 hours as needed for Wheezing 120 each 3     No current facility-administered medications for this visit.        Allergies   Allergen Reactions    Flonase [Fluticasone Propionate] Other (See Comments)     migraine    Prednisolone Anxiety       Past Medical & Surgical History:      Diagnosis Date    Arthritis     Asthma     Grave's disease     Osteopenia     Thyroid disease      Past Surgical History:   Procedure Laterality Date    TONSILLECTOMY         Family History:      Problem Relation Age of Onset    Stroke Mother 68    Emphysema Mother     Heart Attack Father     Stroke Father 80    Rheumatologic Disease Father     COPD Brother     Osteoarthritis Brother        Social History:  Social History     Tobacco Use    Smoking status: Current Every Day Smoker     Packs/day: 2.00     Years: 44.00     Pack years: 88.00     Types: Cigarettes    Smokeless tobacco: Never Used   Substance Use Topics    Alcohol use: No     Alcohol/week: 0.0 standard drinks     Frequency: Never     Binge frequency: Never         There is no immunization history on file for this patient. Review of Systems   Constitutional: Negative for chills and fever. HENT: Positive for sneezing. Negative for sore throat and trouble swallowing. Respiratory: Negative for cough, shortness of breath and wheezing. Cardiovascular: Negative for chest pain, palpitations and leg swelling. Gastrointestinal: Negative for constipation, diarrhea, nausea and vomiting. Musculoskeletal: Positive for arthralgias, back pain and joint swelling. VS:  BP (!) 156/73   Pulse 57   Temp 97.6 °F (36.4 °C) (Temporal)   Ht 5' 4\" (1.626 m)   Wt 222 lb (100.7 kg)   LMP  (LMP Unknown)   BMI 38.11 kg/m²     Physical Exam  Vitals signs reviewed. Constitutional:       General: She is not in acute distress. Appearance: Normal appearance. She is not ill-appearing. HENT:      Head: Normocephalic and atraumatic. Neck:      Musculoskeletal: Normal range of motion. Cardiovascular:      Rate and Rhythm: Normal rate and regular rhythm. Pulses: Normal pulses. Heart sounds: Normal heart sounds. No murmur.    Pulmonary:      Effort: Pulmonary effort

## 2021-07-19 ENCOUNTER — TELEPHONE (OUTPATIENT)
Dept: FAMILY MEDICINE CLINIC | Age: 61
End: 2021-07-19

## 2021-07-27 DIAGNOSIS — M62.830 BACK SPASM: ICD-10-CM

## 2021-07-27 RX ORDER — BACLOFEN 20 MG/1
TABLET ORAL
Qty: 90 TABLET | Refills: 0 | Status: SHIPPED
Start: 2021-07-27 | End: 2021-10-27 | Stop reason: SDUPTHER

## 2021-09-27 ENCOUNTER — OFFICE VISIT (OUTPATIENT)
Dept: FAMILY MEDICINE CLINIC | Age: 61
End: 2021-09-27
Payer: COMMERCIAL

## 2021-09-27 ENCOUNTER — TELEPHONE (OUTPATIENT)
Dept: FAMILY MEDICINE CLINIC | Age: 61
End: 2021-09-27

## 2021-09-27 VITALS
TEMPERATURE: 97.8 F | BODY MASS INDEX: 37.53 KG/M2 | HEIGHT: 63 IN | SYSTOLIC BLOOD PRESSURE: 138 MMHG | DIASTOLIC BLOOD PRESSURE: 67 MMHG | OXYGEN SATURATION: 99 % | WEIGHT: 211.8 LBS | HEART RATE: 56 BPM

## 2021-09-27 DIAGNOSIS — J44.9 ASTHMA WITH COPD (CHRONIC OBSTRUCTIVE PULMONARY DISEASE) (HCC): Primary | ICD-10-CM

## 2021-09-27 DIAGNOSIS — M54.42 CHRONIC MIDLINE LOW BACK PAIN WITH LEFT-SIDED SCIATICA: ICD-10-CM

## 2021-09-27 DIAGNOSIS — G89.29 CHRONIC MIDLINE LOW BACK PAIN WITH LEFT-SIDED SCIATICA: ICD-10-CM

## 2021-09-27 DIAGNOSIS — M17.11 PRIMARY OSTEOARTHRITIS OF RIGHT KNEE: ICD-10-CM

## 2021-09-27 PROCEDURE — G8417 CALC BMI ABV UP PARAM F/U: HCPCS | Performed by: FAMILY MEDICINE

## 2021-09-27 PROCEDURE — 3023F SPIROM DOC REV: CPT | Performed by: FAMILY MEDICINE

## 2021-09-27 PROCEDURE — G8427 DOCREV CUR MEDS BY ELIG CLIN: HCPCS | Performed by: FAMILY MEDICINE

## 2021-09-27 PROCEDURE — G8926 SPIRO NO PERF OR DOC: HCPCS | Performed by: FAMILY MEDICINE

## 2021-09-27 PROCEDURE — 4004F PT TOBACCO SCREEN RCVD TLK: CPT | Performed by: FAMILY MEDICINE

## 2021-09-27 PROCEDURE — 3017F COLORECTAL CA SCREEN DOC REV: CPT | Performed by: FAMILY MEDICINE

## 2021-09-27 PROCEDURE — 99213 OFFICE O/P EST LOW 20 MIN: CPT | Performed by: FAMILY MEDICINE

## 2021-09-27 RX ORDER — ALBUTEROL SULFATE 90 UG/1
2 AEROSOL, METERED RESPIRATORY (INHALATION) EVERY 6 HOURS PRN
Qty: 3 EACH | Refills: 5 | Status: SHIPPED | OUTPATIENT
Start: 2021-09-27

## 2021-09-27 RX ORDER — IBUPROFEN 600 MG/1
600 TABLET ORAL EVERY 6 HOURS PRN
Qty: 180 TABLET | Refills: 1 | Status: SHIPPED
Start: 2021-09-27 | End: 2022-05-09 | Stop reason: SDUPTHER

## 2021-09-27 RX ORDER — ALBUTEROL SULFATE 2.5 MG/3ML
2.5 SOLUTION RESPIRATORY (INHALATION) EVERY 6 HOURS PRN
Qty: 120 EACH | Refills: 3 | Status: SHIPPED | OUTPATIENT
Start: 2021-09-27

## 2021-09-27 RX ORDER — NEBULIZER ACCESSORIES
1 KIT MISCELLANEOUS DAILY PRN
Qty: 1 KIT | Refills: 0 | Status: SHIPPED | OUTPATIENT
Start: 2021-09-27

## 2021-09-27 ASSESSMENT — ENCOUNTER SYMPTOMS
COUGH: 0
TROUBLE SWALLOWING: 0
SORE THROAT: 0
DIARRHEA: 0
WHEEZING: 0
SHORTNESS OF BREATH: 0
NAUSEA: 0
VOMITING: 0
CONSTIPATION: 0
BACK PAIN: 1

## 2021-09-27 NOTE — PROGRESS NOTES
Subjective:    Candance March is here to discuss her asthma, arthritis of the knee and a few other issues for which she is not really interested in pursuing any work ups or therapeutics. She is not really interested in any labs, screenings, preventive measures. ROS: Otherwise negative    Patient Active Problem List   Diagnosis    Goiter    Depression    Fear of needles    Environmental allergies    Insomnia    Back pain    Asthma with COPD (chronic obstructive pulmonary disease) ([facility]    Osteopenia of necks of both femurs       Past medical, surgical, family and social history were reviewed, non-contributory, and unchanged unless otherwise stated. Objective:    /67   Pulse 56   Temp 97.8 °F (36.6 °C) (Temporal)   Ht 5' 3\" (1.6 m)   Wt 211 lb 12.8 oz (96.1 kg)   LMP  (LMP Unknown)   SpO2 99%   BMI 37.52 kg/m²     Exam is as noted by resident with the following changes, additions or corrections:      Assessment/Plan:        Candance March was seen today for 6 month follow-up. Diagnoses and all orders for this visit:    Asthma with COPD (chronic obstructive pulmonary disease) ([facility]  -     Respiratory Therapy Supplies (NEBULIZER/TUBING/MOUTHPIECE) KIT; 1 kit by Does not apply route daily as needed (Wheezing)  -     albuterol (PROVENTIL) (2.5 MG/3ML) 0.083% nebulizer solution; Take 3 mLs by nebulization every 6 hours as needed for Wheezing  -     albuterol sulfate HFA (PROAIR HFA) 108 (90 Base) MCG/ACT inhaler; Inhale 2 puffs into the lungs every 6 hours as needed for Wheezing    Chronic midline low back pain with left-sided sciatica  -     ibuprofen (ADVIL;MOTRIN) 600 MG tablet; Take 1 tablet by mouth every 6 hours as needed for Pain    Primary osteoarthritis of right knee  -     Misc. Devices Marge Colon) MISC; Rollator Walker with Seat           Attending Physician Statement    I have reviewed the chart, including any radiology or labs.  I have discussed the case, including pertinent history and exam findings with the resident. I agree with the assessment, plan and orders as documented by the resident. Please refer to the resident note for additional information.       Electronically signed by Johanne Dempsey DO on 9/30/2021 at 8:30 AM

## 2021-09-27 NOTE — TELEPHONE ENCOUNTER
Pt called in regarding recent script sent and [facility].      Pt says [facility] can fill but they need full script sent to them in order to fill and a reason why pt needs it filled       [facility] [phone]    Fax [phone]

## 2021-09-27 NOTE — PROGRESS NOTES
9/27/2021    Lalitha Almendarez is a 57 y.o. femalehere for:    HPI:    Asthma/COPD: Stable. Some more wheezing with the [place] season. Does well with PRN albuterol and nebulizers. Needs new neb kit as hers broke. Back Pain: Stable. Uses PRN Baclofen and Ibuprofen with good relief. Does not want to pursue any further workup or injections. Right Knee Osteo: She has stiffness and pain daily. Does not want to pursue any further workup. Never wants injections, has severe fear of needles. She is doing well with a cane. Interested in getting a rollator walker. COVID Vaccine Status: [address]  BP Readings from Last 3 Encounters:   09/27/21 138/67   04/16/21 (!) 156/73   01/06/20 125/75     Current Outpatient Medications   Medication Sig Dispense Refill    ibuprofen (ADVIL;MOTRIN) 600 MG tablet Take 1 tablet by mouth every 6 hours as needed for Pain 180 tablet 1    Respiratory Therapy Supplies (NEBULIZER/TUBING/MOUTHPIECE) KIT 1 kit by Does not apply route daily as needed (Wheezing) 1 kit 0    albuterol (PROVENTIL) (2.5 MG/3ML) 0.083% nebulizer solution Take 3 mLs by nebulization every 6 hours as needed for Wheezing 120 each 3    albuterol sulfate HFA (PROAIR HFA) 108 (90 Base) MCG/ACT inhaler Inhale 2 puffs into the lungs every 6 hours as needed for Wheezing 3 each 5    Misc. Devices (WALKER) MISC Rollator Walker with Seat 1 each 0    baclofen (LIORESAL) 20 MG tablet TAKE 1 TABLET BY MOUTH EVERY NIGHT AS NEEDED FOR BACK PAIN 90 tablet 0    Handicap Placard MISC Duration: 1 Year    Dx: Primary Osteoarthritis of Right Knee 1 each 0    famotidine (PEPCID) 20 MG tablet TAKE 1 TABLET BY MOUTH TWICE DAILY 180 tablet 0     No current facility-administered medications for this visit.       Allergies   Allergen Reactions    Flonase [Fluticasone Propionate] Other (See Comments)     migraine    Prednisolone Anxiety       Past Medical & Surgical History:      Diagnosis Date    Arthritis     rhonchi or rales. Comments: Decreased air movement at the bases b/l  Musculoskeletal:      Cervical back: Normal range of motion. Right knee: No swelling, deformity, effusion or crepitus. Decreased range of motion. Right lower leg: No edema. Left lower leg: No edema. Skin:     General: Skin is warm and dry. Neurological:      General: No focal deficit present. Mental Status: She is alert. Assessment/Plan:    1. Asthma with COPD (chronic obstructive pulmonary disease) ([facility])  Stable  Mild worsening with pollen  Needs new [facility] kit  Refill meds  - Respiratory Therapy Supplies (NEBULIZER/TUBING/MOUTHPIECE) KIT; 1 kit by Does not apply route daily as needed (Wheezing)  Dispense: 1 kit; Refill: 0  - albuterol (PROVENTIL) (2.5 MG/3ML) 0.083% nebulizer solution; Take 3 mLs by nebulization every 6 hours as needed for Wheezing  Dispense: 120 each; Refill: 3  - albuterol sulfate HFA (PROAIR HFA) 108 (90 Base) MCG/ACT inhaler; Inhale 2 puffs into the lungs every 6 hours as needed for Wheezing  Dispense: 3 each; Refill: 5    2. Chronic midline low back pain with left-sided sciatica  Doing well with PRN ibuprofen and Baclofen  Does not need Baclofen refill yet  Overall stable  Does not want further workup or management  - ibuprofen (ADVIL;MOTRIN) 600 MG tablet; Take 1 tablet by mouth every 6 hours as needed for Pain  Dispense: 180 tablet; Refill: 1    3. Primary osteoarthritis of right knee  Advised NSAIDs and ice/heating as needed  She does not ever want a knee injection  Does not want any referral for knee replacement  Would like walker for increased ability to rest when out shopping  Doing better with [address] [address] but still some instability  - Misc. Devices Thea Harvey) MISC; Rollator Walker with Seat  Dispense: 1 each; Refill: 0    Ms Andrea Bolds would not like to do any screenings at any point. She does not want to know if she has high cholesterol, diabetes or any cancer.  She does not want any needles or vaccinations. She was counseled on the risk of having a stroke or heart attack without knowing her medical conditions. She understands this.      Follow up:  6 months    Patient agrees with the above stated plan.       Hector Sterling MD  PGY-3 Family Medicine

## 2021-09-27 NOTE — TELEPHONE ENCOUNTER
Patient has moderate persistent asthma with COPD and is already on nebulizer therapy and doing well however her device is broken according to her. DME order placed.     Electronically signed by Winnie Kaur MD on 9/27/2021 at 3:34 PM

## 2021-10-27 DIAGNOSIS — K21.00 GASTROESOPHAGEAL REFLUX DISEASE WITH ESOPHAGITIS: ICD-10-CM

## 2021-10-27 DIAGNOSIS — M62.830 BACK SPASM: ICD-10-CM

## 2021-10-27 RX ORDER — BACLOFEN 20 MG/1
TABLET ORAL
Qty: 90 TABLET | Refills: 1 | Status: SHIPPED
Start: 2021-10-27 | End: 2022-05-09 | Stop reason: SDUPTHER

## 2021-10-27 RX ORDER — FAMOTIDINE 20 MG/1
TABLET, FILM COATED ORAL
Qty: 180 TABLET | Refills: 1 | Status: SHIPPED
Start: 2021-10-27 | End: 2022-05-09 | Stop reason: SDUPTHER

## 2022-05-05 DIAGNOSIS — M54.42 CHRONIC MIDLINE LOW BACK PAIN WITH LEFT-SIDED SCIATICA: ICD-10-CM

## 2022-05-05 DIAGNOSIS — M62.830 BACK SPASM: ICD-10-CM

## 2022-05-05 DIAGNOSIS — G89.29 CHRONIC MIDLINE LOW BACK PAIN WITH LEFT-SIDED SCIATICA: ICD-10-CM

## 2022-05-05 RX ORDER — IBUPROFEN 600 MG/1
600 TABLET ORAL EVERY 6 HOURS PRN
Qty: 180 TABLET | Refills: 1 | OUTPATIENT
Start: 2022-05-05

## 2022-05-05 RX ORDER — BACLOFEN 20 MG/1
TABLET ORAL
Qty: 90 TABLET | Refills: 1 | OUTPATIENT
Start: 2022-05-05

## 2022-05-05 NOTE — TELEPHONE ENCOUNTER
Meds refused by Dr Mary Bernal until patient is seen. She has a VV appt on Monday. She needs to keep to discuss med refills.

## 2022-05-09 ENCOUNTER — TELEMEDICINE (OUTPATIENT)
Dept: FAMILY MEDICINE CLINIC | Age: 62
End: 2022-05-09
Payer: COMMERCIAL

## 2022-05-09 DIAGNOSIS — G89.29 CHRONIC MIDLINE LOW BACK PAIN WITH LEFT-SIDED SCIATICA: ICD-10-CM

## 2022-05-09 DIAGNOSIS — J44.9 ASTHMA WITH COPD (CHRONIC OBSTRUCTIVE PULMONARY DISEASE) (HCC): Primary | ICD-10-CM

## 2022-05-09 DIAGNOSIS — K21.00 GASTROESOPHAGEAL REFLUX DISEASE WITH ESOPHAGITIS WITHOUT HEMORRHAGE: ICD-10-CM

## 2022-05-09 DIAGNOSIS — M54.42 CHRONIC MIDLINE LOW BACK PAIN WITH LEFT-SIDED SCIATICA: ICD-10-CM

## 2022-05-09 DIAGNOSIS — Z91.09 ENVIRONMENTAL ALLERGIES: ICD-10-CM

## 2022-05-09 PROCEDURE — G8427 DOCREV CUR MEDS BY ELIG CLIN: HCPCS | Performed by: FAMILY MEDICINE

## 2022-05-09 PROCEDURE — 99213 OFFICE O/P EST LOW 20 MIN: CPT | Performed by: FAMILY MEDICINE

## 2022-05-09 PROCEDURE — 3017F COLORECTAL CA SCREEN DOC REV: CPT | Performed by: FAMILY MEDICINE

## 2022-05-09 RX ORDER — FAMOTIDINE 20 MG/1
TABLET, FILM COATED ORAL
Qty: 180 TABLET | Refills: 1 | Status: SHIPPED | OUTPATIENT
Start: 2022-05-09

## 2022-05-09 RX ORDER — FEXOFENADINE HCL 180 MG/1
180 TABLET ORAL DAILY
Qty: 90 TABLET | Refills: 1 | Status: SHIPPED | OUTPATIENT
Start: 2022-05-09

## 2022-05-09 RX ORDER — IBUPROFEN 600 MG/1
600 TABLET ORAL EVERY 6 HOURS PRN
Qty: 180 TABLET | Refills: 1 | Status: SHIPPED | OUTPATIENT
Start: 2022-05-09

## 2022-05-09 RX ORDER — BACLOFEN 20 MG/1
TABLET ORAL
Qty: 90 TABLET | Refills: 1 | Status: SHIPPED | OUTPATIENT
Start: 2022-05-09

## 2022-05-09 SDOH — ECONOMIC STABILITY: FOOD INSECURITY: WITHIN THE PAST 12 MONTHS, YOU WORRIED THAT YOUR FOOD WOULD RUN OUT BEFORE YOU GOT MONEY TO BUY MORE.: NEVER TRUE

## 2022-05-09 SDOH — ECONOMIC STABILITY: FOOD INSECURITY: WITHIN THE PAST 12 MONTHS, THE FOOD YOU BOUGHT JUST DIDN'T LAST AND YOU DIDN'T HAVE MONEY TO GET MORE.: NEVER TRUE

## 2022-05-09 ASSESSMENT — PATIENT HEALTH QUESTIONNAIRE - PHQ9
SUM OF ALL RESPONSES TO PHQ QUESTIONS 1-9: 4
8. MOVING OR SPEAKING SO SLOWLY THAT OTHER PEOPLE COULD HAVE NOTICED. OR THE OPPOSITE, BEING SO FIGETY OR RESTLESS THAT YOU HAVE BEEN MOVING AROUND A LOT MORE THAN USUAL: 0
SUM OF ALL RESPONSES TO PHQ QUESTIONS 1-9: 4
SUM OF ALL RESPONSES TO PHQ QUESTIONS 1-9: 4
SUM OF ALL RESPONSES TO PHQ9 QUESTIONS 1 & 2: 3
2. FEELING DOWN, DEPRESSED OR HOPELESS: 0
10. IF YOU CHECKED OFF ANY PROBLEMS, HOW DIFFICULT HAVE THESE PROBLEMS MADE IT FOR YOU TO DO YOUR WORK, TAKE CARE OF THINGS AT HOME, OR GET ALONG WITH OTHER PEOPLE: 1
9. THOUGHTS THAT YOU WOULD BE BETTER OFF DEAD, OR OF HURTING YOURSELF: 0
5. POOR APPETITE OR OVEREATING: 0
7. TROUBLE CONCENTRATING ON THINGS, SUCH AS READING THE NEWSPAPER OR WATCHING TELEVISION: 0
4. FEELING TIRED OR HAVING LITTLE ENERGY: 1
SUM OF ALL RESPONSES TO PHQ QUESTIONS 1-9: 4
1. LITTLE INTEREST OR PLEASURE IN DOING THINGS: 3
6. FEELING BAD ABOUT YOURSELF - OR THAT YOU ARE A FAILURE OR HAVE LET YOURSELF OR YOUR FAMILY DOWN: 0
3. TROUBLE FALLING OR STAYING ASLEEP: 0

## 2022-05-09 ASSESSMENT — ENCOUNTER SYMPTOMS
VOMITING: 0
WHEEZING: 0
DIARRHEA: 0
CONSTIPATION: 0
SORE THROAT: 0
TROUBLE SWALLOWING: 0
SHORTNESS OF BREATH: 0
COUGH: 0
NAUSEA: 0

## 2022-05-09 ASSESSMENT — SOCIAL DETERMINANTS OF HEALTH (SDOH): HOW HARD IS IT FOR YOU TO PAY FOR THE VERY BASICS LIKE FOOD, HOUSING, MEDICAL CARE, AND HEATING?: NOT HARD AT ALL

## 2022-05-09 NOTE — PROGRESS NOTES
2022    TELEHEALTH EVALUATION -- Audio/Visual (During XENJR-44 public health emergency)    HPI:    Ambreen Kemp (:  1960) has requested an audio/video evaluation for the following concern(s):    Asthma: On PRN Albuterol and doing well. No exacerbations    Chronic Back pain: Using PRN NSAIDs and baclofen. She has been offered workup and alternative therapies in the past and she does not want this. Does well with mild PRN medications. Takes the baclofen at night when needed. Typically only half a tablet. GERD: On BID Pepcid. No issues. Tolerating. No pain currently. She does not want to do any screenings or blood work at any point. Review of Systems   Constitutional: Negative for chills and fever. HENT: Negative for sore throat and trouble swallowing. Respiratory: Negative for cough, shortness of breath and wheezing. Cardiovascular: Negative for chest pain, palpitations and leg swelling. Gastrointestinal: Negative for constipation, diarrhea, nausea and vomiting. Genitourinary: Negative for dysuria and hematuria. Neurological: Negative for light-headedness and headaches. Prior to Visit Medications    Medication Sig Taking?  Authorizing Provider   baclofen (LIORESAL) 20 MG tablet TAKE 1 TABLET BY MOUTH EVERY NIGHT AS NEEDED FOR BACK PAIN Yes Marcelina García MD   ibuprofen (ADVIL;MOTRIN) 600 MG tablet Take 1 tablet by mouth every 6 hours as needed for Pain Yes Marcelina García MD   famotidine (PEPCID) 20 MG tablet TAKE 1 TABLET BY MOUTH TWICE DAILY Yes Marcelina García MD   fexofenadine (ALLEGRA) 180 MG tablet Take 1 tablet by mouth daily Yes Marcelina García MD   Respiratory Therapy Supplies (NEBULIZER/TUBING/MOUTHPIECE) KIT 1 kit by Does not apply route daily as needed (Wheezing) Yes Marcelina García MD   albuterol (PROVENTIL) (2.5 MG/3ML) 0.083% nebulizer solution Take 3 mLs by nebulization every 6 hours as needed for Wheezing Yes Marcelina García MD   albuterol sulfate HFA (Tedra Hy HFA) 108 (90 Base) MCG/ACT inhaler Inhale 2 puffs into the lungs every 6 hours as needed for Wheezing Yes Ramón Posada MD   [facility] [place] (WALKER) MISC Rollator Walker with Seat Yes Ramón Posada MD   Handicap Placard MISC Duration: 1 Year    Dx: Primary Osteoarthritis of Right Knee Yes Ramón Posada MD       Social History     Tobacco Use    Smoking status: Current Every Day Smoker     Packs/day: 2.00     Years: 44.00     Pack years: 88.00     Types: Cigarettes    Smokeless tobacco: Never Used   Vaping Use    Vaping Use: Never used   Substance Use Topics    Alcohol use: No     Alcohol/week: 0.0 standard drinks    Drug use: No        PHYSICAL EXAMINATION:  [ INSTRUCTIONS:  \"[x]\" Indicates a positive item  \"[]\" Indicates a negative item  -- DELETE ALL ITEMS NOT EXAMINED]  Vital Signs: (As obtained by patient/caregiver or practitioner observation)      Constitutional: [x] Appears well-developed and well-nourished [] No apparent distress      [] Abnormal-   Mental status  [x] Alert and awake  [x] Oriented to person/place/time []Able to follow commands      Eyes:  EOM    [x]  Normal  [] Abnormal-  Sclera  [x]  Normal  [] Abnormal -         Discharge [x]  None visible  [] Abnormal -    HENT:   [x] Normocephalic, atraumatic.   [] Abnormal   [x] Mouth/Throat: Mucous membranes are moist.     External Ears [x] Normal  [] Abnormal-     Neck: [] No visualized mass     Pulmonary/Chest: [x] Respiratory effort normal.  [x] No visualized signs of difficulty breathing or respiratory distress        [] Abnormal-      Musculoskeletal:   [x] Normal gait with no signs of ataxia         [x] Normal range of motion of neck        [] Abnormal-       Neurological:        [x] No Facial Asymmetry (Cranial nerve 7 motor function) (limited exam to video visit)          [x] No gaze palsy        [] Abnormal-         Skin:        [x] No significant exanthematous lesions or discoloration noted on facial skin         [] Abnormal- Psychiatric:       [x] Normal Affect [x] No Hallucinations        [] Abnormal-     Other pertinent observable physical exam findings-     ASSESSMENT/PLAN:    1. Asthma with COPD (chronic obstructive pulmonary disease) ([facility]  Will trial Allegra, did not tolerate Xyzal due to drowsiness  Cont PRN Albuterol  Stable  No exacerbations  - fexofenadine (ALLEGRA) 180 MG tablet; Take 1 tablet by mouth daily  Dispense: 90 tablet; Refill: 1    2. Chronic midline low back pain with left-sided sciatica  Stable  Does not want any further workup or treatment options for this  No imaging or pain management ever  Doing well with PRN medications  - baclofen (LIORESAL) 20 MG tablet; TAKE 1 TABLET BY MOUTH EVERY NIGHT AS NEEDED FOR BACK PAIN  Dispense: 90 tablet; Refill: 1  - ibuprofen (ADVIL;MOTRIN) 600 MG tablet; Take 1 tablet by mouth every 6 hours as needed for Pain  Dispense: 180 tablet; Refill: 1    3. Gastroesophageal reflux disease with esophagitis without hemorrhage  Stable  No pain  Tolerating Pepcid  Normal food intake  - famotidine (PEPCID) 20 MG tablet; TAKE 1 TABLET BY MOUTH TWICE DAILY  Dispense: 180 tablet; Refill: 1    4. Environmental allergies  Trial Allegra  Did not tolerate Xyzal  - fexofenadine (ALLEGRA) 180 MG tablet; Take 1 tablet by mouth daily  Dispense: 90 tablet; Refill: 1    Ms Brandon Mccrary would not like to do any screenings at any point. She does not want to know if she has high cholesterol, diabetes or any cancer. She does not want any needles or vaccinations. She was counseled on the risk of having a stroke or heart attack without knowing her medical conditions. She understands this.     Return in about 6 months (around 11/9/2022) for Routine follow up and est new PCP. Eric Pascal, was evaluated through a synchronous (real-time) audio-video encounter. The patient (or guardian if applicable) is aware that this is a billable service, which includes applicable co-pays.  This Virtual Visit was conducted with patient's (and/or legal guardian's) consent. The visit was conducted pursuant to the emergency declaration under the [address] [place] [address] authority and the [employer] General Act. Patient identification was verified, and a caregiver was present when appropriate. The patient was located at home in a state where the provider was licensed to provide care. Total time spent on this encounter: Not billed by time    --Ramón Posada MD on 5/9/2022 at 3:00 PM    An electronic signature was used to authenticate this note.

## 2022-05-09 NOTE — PROGRESS NOTES
[facility]  Precepting Note    Subjective:  Virtual visit  Asthma, COPD  On antihistamine  Chronic lower back pain  On prn Baclofen      ROS otherwise negative     Past medical, surgical, family and social history were reviewed, non-contributory, and unchanged unless otherwise stated. Objective:    LMP  (LMP Unknown)     Exam is as noted by resident    Assessment/Plan:  Asthma  COPD  GERD    Switch antihistamine  Continue other meds  HM: update  F/u as instructed     Attending Physician Statement  I have reviewed the chart, including any radiology or labs. I have discussed the case, including pertinent history and exam findings with the resident. I agree with the assessment, plan and orders as documented by the resident. Please refer to the resident note for additional information.       Electronically signed by Alain Rubinstein, MD on 5/9/2022 at 3:05 PM

## 2022-05-10 ENCOUNTER — TELEPHONE (OUTPATIENT)
Dept: FAMILY MEDICINE CLINIC | Age: 62
End: 2022-05-10

## 2022-05-10 DIAGNOSIS — M85.852 OSTEOPENIA OF NECKS OF BOTH FEMURS: ICD-10-CM

## 2022-05-10 DIAGNOSIS — J44.9 ASTHMA WITH COPD (CHRONIC OBSTRUCTIVE PULMONARY DISEASE) (HCC): ICD-10-CM

## 2022-05-10 DIAGNOSIS — M17.11 PRIMARY OSTEOARTHRITIS OF RIGHT KNEE: Primary | ICD-10-CM

## 2022-05-10 DIAGNOSIS — M85.851 OSTEOPENIA OF NECKS OF BOTH FEMURS: ICD-10-CM

## 2022-05-16 ENCOUNTER — TELEPHONE (OUTPATIENT)
Dept: FAMILY MEDICINE CLINIC | Age: 62
End: 2022-05-16

## 2022-05-16 DIAGNOSIS — M85.851 OSTEOPENIA OF NECKS OF BOTH FEMURS: ICD-10-CM

## 2022-05-16 DIAGNOSIS — J44.9 ASTHMA WITH COPD (CHRONIC OBSTRUCTIVE PULMONARY DISEASE) (HCC): ICD-10-CM

## 2022-05-16 DIAGNOSIS — M17.11 PRIMARY OSTEOARTHRITIS OF RIGHT KNEE: Primary | ICD-10-CM

## 2022-05-16 DIAGNOSIS — G89.29 CHRONIC MIDLINE LOW BACK PAIN WITH LEFT-SIDED SCIATICA: ICD-10-CM

## 2022-05-16 DIAGNOSIS — M54.42 CHRONIC MIDLINE LOW BACK PAIN WITH LEFT-SIDED SCIATICA: ICD-10-CM

## 2022-05-16 DIAGNOSIS — M85.852 OSTEOPENIA OF NECKS OF BOTH FEMURS: ICD-10-CM

## 2022-05-16 NOTE — TELEPHONE ENCOUNTER
Pt states that it needs to have [facility] name on it and your signature. You'll have to handwrite a script for her.

## 2022-05-16 NOTE — TELEPHONE ENCOUNTER
Is it possible to reprint the script? It was supposed to be on script paper last time however it did not work for some reason.  If a new order needs to be placed I can do that this afternoon    Electronically signed by Bradley Walter MD on 5/16/2022 at 11:08 AM

## 2022-05-16 NOTE — TELEPHONE ENCOUNTER
Patient called regarding letter for handicap placard, per [facility] needs to be an actual script, will not accept a letter. Once done, please mail to patient.

## 2024-02-02 ENCOUNTER — OFFICE VISIT (OUTPATIENT)
Dept: FAMILY MEDICINE CLINIC | Age: 64
End: 2024-02-02

## 2024-02-02 VITALS
SYSTOLIC BLOOD PRESSURE: 144 MMHG | WEIGHT: 215 LBS | OXYGEN SATURATION: 99 % | HEIGHT: 63 IN | BODY MASS INDEX: 38.09 KG/M2 | RESPIRATION RATE: 18 BRPM | DIASTOLIC BLOOD PRESSURE: 60 MMHG | TEMPERATURE: 97.5 F | HEART RATE: 74 BPM

## 2024-02-02 DIAGNOSIS — J44.9 CHRONIC OBSTRUCTIVE PULMONARY DISEASE, UNSPECIFIED COPD TYPE (HCC): Primary | ICD-10-CM

## 2024-02-02 DIAGNOSIS — I10 HYPERTENSION, UNSPECIFIED TYPE: ICD-10-CM

## 2024-02-02 DIAGNOSIS — G89.29 CHRONIC MIDLINE LOW BACK PAIN WITH LEFT-SIDED SCIATICA: ICD-10-CM

## 2024-02-02 DIAGNOSIS — J44.89 ASTHMA WITH COPD (CHRONIC OBSTRUCTIVE PULMONARY DISEASE): ICD-10-CM

## 2024-02-02 DIAGNOSIS — M54.42 CHRONIC MIDLINE LOW BACK PAIN WITH LEFT-SIDED SCIATICA: ICD-10-CM

## 2024-02-02 DIAGNOSIS — F41.9 ANXIETY: ICD-10-CM

## 2024-02-02 RX ORDER — ALBUTEROL SULFATE 90 UG/1
2 AEROSOL, METERED RESPIRATORY (INHALATION) EVERY 6 HOURS PRN
Qty: 3 EACH | Refills: 5 | Status: SHIPPED | OUTPATIENT
Start: 2024-02-02

## 2024-02-02 RX ORDER — BACLOFEN 20 MG/1
TABLET ORAL
Qty: 90 TABLET | Refills: 1 | Status: SHIPPED | OUTPATIENT
Start: 2024-02-02

## 2024-02-02 RX ORDER — ALBUTEROL SULFATE 2.5 MG/3ML
2.5 SOLUTION RESPIRATORY (INHALATION) EVERY 6 HOURS PRN
Qty: 120 EACH | Refills: 3 | Status: SHIPPED | OUTPATIENT
Start: 2024-02-02

## 2024-02-02 ASSESSMENT — PATIENT HEALTH QUESTIONNAIRE - PHQ9
SUM OF ALL RESPONSES TO PHQ QUESTIONS 1-9: 0
8. MOVING OR SPEAKING SO SLOWLY THAT OTHER PEOPLE COULD HAVE NOTICED. OR THE OPPOSITE, BEING SO FIGETY OR RESTLESS THAT YOU HAVE BEEN MOVING AROUND A LOT MORE THAN USUAL: 0
SUM OF ALL RESPONSES TO PHQ9 QUESTIONS 1 & 2: 0
SUM OF ALL RESPONSES TO PHQ QUESTIONS 1-9: 0
SUM OF ALL RESPONSES TO PHQ QUESTIONS 1-9: 0
7. TROUBLE CONCENTRATING ON THINGS, SUCH AS READING THE NEWSPAPER OR WATCHING TELEVISION: 0
4. FEELING TIRED OR HAVING LITTLE ENERGY: 0
3. TROUBLE FALLING OR STAYING ASLEEP: 0
9. THOUGHTS THAT YOU WOULD BE BETTER OFF DEAD, OR OF HURTING YOURSELF: 0
2. FEELING DOWN, DEPRESSED OR HOPELESS: 0
10. IF YOU CHECKED OFF ANY PROBLEMS, HOW DIFFICULT HAVE THESE PROBLEMS MADE IT FOR YOU TO DO YOUR WORK, TAKE CARE OF THINGS AT HOME, OR GET ALONG WITH OTHER PEOPLE: 0
5. POOR APPETITE OR OVEREATING: 0
1. LITTLE INTEREST OR PLEASURE IN DOING THINGS: 0
6. FEELING BAD ABOUT YOURSELF - OR THAT YOU ARE A FAILURE OR HAVE LET YOURSELF OR YOUR FAMILY DOWN: 0
SUM OF ALL RESPONSES TO PHQ QUESTIONS 1-9: 0

## 2024-02-02 ASSESSMENT — ENCOUNTER SYMPTOMS
COUGH: 0
CONSTIPATION: 0
ABDOMINAL PAIN: 0
VOMITING: 0
NAUSEA: 0
SORE THROAT: 0
DIARRHEA: 0
SHORTNESS OF BREATH: 0
CHEST TIGHTNESS: 0
RHINORRHEA: 0

## 2024-02-02 NOTE — PROGRESS NOTES
[facility]  Precepting Note    Subjective:  COPD and Asthma: on Albuterol inhaler and nebulizer. SOB with exertion, smoker  Goiter: hx of goiter  Low back pain : DDD, THC gummies helping  BP is elevated  Anxiety: family stressors- death in family    ROS otherwise negative     Past medical, surgical, family and social history were reviewed, non-contributory, and unchanged unless otherwise stated.    Objective:    BP (!) 144/60   Pulse 74   Temp 97.5 °F (36.4 °C)   Resp 18   Ht 1.6 m (5' 2.99\")   Wt 97.5 kg (215 lb)   LMP  (LMP Unknown)   SpO2 99%   BMI 38.10 kg/m²     Exam is as noted by resident with the following changes, additions or corrections:  Lungs: diminished breath sounds    Assessment/Plan:  COPD and Asthma: Continue the current treatment  HTN: monitor, declined meds, DASH diet  Lower back pain:Continue Baclofen  Anxiety: follow with counseling  Declined labs and screening labs     Attending Physician Statement  I have reviewed the chart, including any radiology or labs. I have discussed the case, including pertinent history and exam findings with the resident.  I agree with the assessment, plan and orders as documented by the resident.  Please refer to the resident note for additional information.      Electronically signed by COLIN MOLINA MD on 2/2/2024 at 9:19 AM

## 2024-02-02 NOTE — PROGRESS NOTES
[facility]  Department of Family Medicine  Family Medicine Residency Program      Patient: Cindy Sanchez 63 y.o. female     Date of Service: 2/2/24      Chief complaint:   Chief Complaint   Patient presents with    Asthma    COPD       HISTORY OF PRESENTING ILLNESS     63 y.o. female presented to the clinic for a FU.    Asthma/COPD:  -albuterol PRN. 3-4 times/month.  -using nabs  -not using allegra.   -using benadryl, PRN.   - chronic smoker, 1-2 PPD.   -does not want to start any meds or treatment plan for smoking cessation.   -SOB on exertion only.     Back pain:  - hx of degenerative dx.   -was taking baclofen, advil.   - now using THC gummies   - osteopenia on DEXA  -doing dietary modification.     Goiter hx:  -not on meds.   -increase salt with iodine intake.     BP:  -high readings.   -not on any meds .   -denies CP. Some changes in vision, on Monday is going to see ophthalm.  -hx of cataract.     Anxiety:  -has had multiple family members passed away, also history of siblings with tragic loss of life (oldest of 12 siblings). Feels like she has support from family, but also feels they add to stress. Has counselor saw her yesterday. Denies SI and HI, not seeking help medically (has tried xanax, cymbalta... and others)   -not on meds.   -does     -PT REFUSED TO GIVE BLOOD FOR BLOOD TESTING> GETS AGGRESSIVE WHEN OFFERED>   -PT declines screenings and vaccines.     Health Maintenance:  Health Maintenance Due   Topic Date Due    Pneumococcal 0-64 years Vaccine (1 - PCV) Never done    HIV screen  Never done    Hepatitis C screen  Never done    DTaP/Tdap/Td vaccine (1 - Tdap) Never done    Cervical cancer screen  Never done    Diabetes screen  Never done    Lipids  Never done    Colorectal Cancer Screen  Never done    Shingles vaccine (1 of 2) Never done    Breast cancer screen  07/29/2017    Respiratory Syncytial Virus (RSV) Pregnant or age 60 yrs+ (1 - 1-dose 60+ series) Never done

## 2024-07-29 DIAGNOSIS — G89.29 CHRONIC MIDLINE LOW BACK PAIN WITH LEFT-SIDED SCIATICA: ICD-10-CM

## 2024-07-29 DIAGNOSIS — M54.42 CHRONIC MIDLINE LOW BACK PAIN WITH LEFT-SIDED SCIATICA: ICD-10-CM

## 2024-07-29 RX ORDER — BACLOFEN 20 MG/1
TABLET ORAL
Qty: 90 TABLET | Refills: 1 | Status: SHIPPED | OUTPATIENT
Start: 2024-07-29

## 2024-08-13 ENCOUNTER — OFFICE VISIT (OUTPATIENT)
Dept: FAMILY MEDICINE CLINIC | Age: 64
End: 2024-08-13

## 2024-08-13 VITALS
BODY MASS INDEX: 38.67 KG/M2 | OXYGEN SATURATION: 98 % | RESPIRATION RATE: 20 BRPM | SYSTOLIC BLOOD PRESSURE: 138 MMHG | HEIGHT: 63 IN | WEIGHT: 218.26 LBS | HEART RATE: 69 BPM | TEMPERATURE: 98 F | DIASTOLIC BLOOD PRESSURE: 68 MMHG

## 2024-08-13 DIAGNOSIS — Z00.00 MEDICARE ANNUAL WELLNESS VISIT, INITIAL: Primary | ICD-10-CM

## 2024-08-13 DIAGNOSIS — M85.80 OSTEOPENIA, UNSPECIFIED LOCATION: ICD-10-CM

## 2024-08-13 DIAGNOSIS — Z71.89 ACP (ADVANCE CARE PLANNING): ICD-10-CM

## 2024-08-13 DIAGNOSIS — K21.00 GASTROESOPHAGEAL REFLUX DISEASE WITH ESOPHAGITIS WITHOUT HEMORRHAGE: ICD-10-CM

## 2024-08-13 RX ORDER — ERGOCALCIFEROL 1.25 MG/1
50000 CAPSULE ORAL WEEKLY
Qty: 4 CAPSULE | Refills: 5 | Status: SHIPPED | OUTPATIENT
Start: 2024-08-13

## 2024-08-13 RX ORDER — CALCIUM CARBONATE 10GR (648 MG) 648 MG/1
1 TABLET ORAL 2 TIMES DAILY
Qty: 60 TABLET | Refills: 2 | Status: SHIPPED | OUTPATIENT
Start: 2024-08-13 | End: 2025-08-13

## 2024-08-13 RX ORDER — FAMOTIDINE 20 MG/1
TABLET, FILM COATED ORAL
Qty: 180 TABLET | Refills: 1 | Status: SHIPPED | OUTPATIENT
Start: 2024-08-13

## 2024-08-13 SDOH — ECONOMIC STABILITY: FOOD INSECURITY: WITHIN THE PAST 12 MONTHS, YOU WORRIED THAT YOUR FOOD WOULD RUN OUT BEFORE YOU GOT MONEY TO BUY MORE.: NEVER TRUE

## 2024-08-13 SDOH — ECONOMIC STABILITY: FOOD INSECURITY: WITHIN THE PAST 12 MONTHS, THE FOOD YOU BOUGHT JUST DIDN'T LAST AND YOU DIDN'T HAVE MONEY TO GET MORE.: NEVER TRUE

## 2024-08-13 SDOH — ECONOMIC STABILITY: INCOME INSECURITY: HOW HARD IS IT FOR YOU TO PAY FOR THE VERY BASICS LIKE FOOD, HOUSING, MEDICAL CARE, AND HEATING?: NOT HARD AT ALL

## 2024-08-13 ASSESSMENT — PATIENT HEALTH QUESTIONNAIRE - PHQ9
8. MOVING OR SPEAKING SO SLOWLY THAT OTHER PEOPLE COULD HAVE NOTICED. OR THE OPPOSITE, BEING SO FIGETY OR RESTLESS THAT YOU HAVE BEEN MOVING AROUND A LOT MORE THAN USUAL: NOT AT ALL
SUM OF ALL RESPONSES TO PHQ QUESTIONS 1-9: 6
7. TROUBLE CONCENTRATING ON THINGS, SUCH AS READING THE NEWSPAPER OR WATCHING TELEVISION: NOT AT ALL
10. IF YOU CHECKED OFF ANY PROBLEMS, HOW DIFFICULT HAVE THESE PROBLEMS MADE IT FOR YOU TO DO YOUR WORK, TAKE CARE OF THINGS AT HOME, OR GET ALONG WITH OTHER PEOPLE: SOMEWHAT DIFFICULT
5. POOR APPETITE OR OVEREATING: NOT AT ALL
1. LITTLE INTEREST OR PLEASURE IN DOING THINGS: MORE THAN HALF THE DAYS
SUM OF ALL RESPONSES TO PHQ QUESTIONS 1-9: 6
4. FEELING TIRED OR HAVING LITTLE ENERGY: SEVERAL DAYS
2. FEELING DOWN, DEPRESSED OR HOPELESS: MORE THAN HALF THE DAYS
6. FEELING BAD ABOUT YOURSELF - OR THAT YOU ARE A FAILURE OR HAVE LET YOURSELF OR YOUR FAMILY DOWN: NOT AT ALL
SUM OF ALL RESPONSES TO PHQ QUESTIONS 1-9: 6
3. TROUBLE FALLING OR STAYING ASLEEP: SEVERAL DAYS
SUM OF ALL RESPONSES TO PHQ9 QUESTIONS 1 & 2: 4
SUM OF ALL RESPONSES TO PHQ QUESTIONS 1-9: 6
9. THOUGHTS THAT YOU WOULD BE BETTER OFF DEAD, OR OF HURTING YOURSELF: NOT AT ALL

## 2024-08-13 ASSESSMENT — LIFESTYLE VARIABLES
HOW OFTEN DO YOU HAVE A DRINK CONTAINING ALCOHOL: NEVER
HOW MANY STANDARD DRINKS CONTAINING ALCOHOL DO YOU HAVE ON A TYPICAL DAY: PATIENT DOES NOT DRINK

## 2024-08-13 NOTE — PROGRESS NOTES
[facility]  Precepting Note    Subjective:  Here for AWV  Having some pain related to known lumbar DJD, taking ibuprofen, lidocaine topicala   - feels unsteady  Smokes 1 pdd  Overdue to see dentist  Smoke detectors  No living will - would like to be DNR/ CCA  She does have firearm which is secured at home.   ROS otherwise as per resident note     Past medical, surgical, family and social history were reviewed, non-contributory, and unchanged unless otherwise stated.    Objective:    /68 (Site: Right Upper Arm, Position: Sitting)   Pulse 69   Temp 98 °F (36.7 °C)   Resp 20   Ht 1.6 m (5' 2.99\")   Wt 99 kg (218 lb 4.1 oz)   LMP  (LMP Unknown)   SpO2 98%   BMI 38.67 kg/m²     Exam is as noted by resident     Assessment/Plan:  AWV   Lumbar DJD  Preventive health care reviewed  Patient adamant about refusing vaccines, labs, screening tests  Refer to acp specialist to complete living will       Attending Physician Statement  I have reviewed the chart, including any radiology or labs. I have discussed the case, including pertinent history and exam findings with the resident.  I agree with the assessment, plan and orders as documented by the resident.  Please refer to the resident note for additional information.      Electronically signed by Leela Pham MD on 8/13/2024 at 9:23 AM  
carpets or rugs?: (!) Yes (grandkids toys everywhere)  Interventions:  Pt refused    ADL's:   Patient reports needing help with:  Select all that apply: (!) Laundry  Interventions:  Patient declined any further interventions or treatment    Advanced Directives:  Do you have a Living Will?: (!) No    Intervention:  has NO advanced directive  - referred to ACP Coordinator                 Tobacco Use:    Tobacco Use      Smoking status: Every Day        Packs/day: 1.00        Years: 2.0 packs/day for 45.6 years (89.6 ttl pk-yrs)        Types: Cigarettes        Start date: 2023        Passive exposure: Current      Smokeless tobacco: Never     Interventions:  Patient declined any further intervention or treatment                      Objective   Vitals:    08/13/24 0833   BP: 138/68   Site: Right Upper Arm   Position: Sitting   Pulse: 69   Resp: 20   Temp: 98 °F (36.7 °C)   SpO2: 98%   Weight: 99 kg (218 lb 4.1 oz)   Height: 1.6 m (5' 2.99\")      Body mass index is 38.67 kg/m².                  Allergies   Allergen Reactions    Flonase [Fluticasone Propionate] Other (See Comments)     migraine    Prednisolone Anxiety     Prior to Visit Medications    Medication Sig Taking? Authorizing Provider   famotidine (PEPCID) 20 MG tablet TAKE 1 TABLET BY MOUTH TWICE DAILY Yes Tamara Shelton MD   vitamin D (ERGOCALCIFEROL) 1.25 MG (96878 UT) CAPS capsule Take 1 capsule by mouth once a week Yes Tamara Shelton MD   calcium carbonate 648 MG TABS Take 1 tablet by mouth 2 times daily Yes Tamara Shelton MD   baclofen (LIORESAL) 20 MG tablet TAKE 1 TABLET BY MOUTH EVERY NIGHT AS NEEDED FOR BACK PAIN Yes Tamara Shelton MD   albuterol (PROVENTIL) (2.5 MG/3ML) 0.083% nebulizer solution Take 3 mLs by nebulization every 6 hours as needed for Wheezing Yes Tamara Shelton MD   albuterol sulfate HFA (PROAIR HFA) 108 (90 Base) MCG/ACT inhaler Inhale 2 puffs into the lungs every 6 hours as needed for Wheezing Yes Tamara Shelton MD   Handicap

## 2024-08-13 NOTE — PATIENT INSTRUCTIONS
[facility] Food Resources*  (Call [employer]/211 for more resources)         HELP NETWORK OF [facility]:  What they do: Provides 24-hr, 7 days a week access to information on community resources for food & clothing help for [facility] AND [facility]  Phone: 211 or 125-677-9696  Text “HELP NETWORK” to 260048 for local food resources  DEPARTMENT OF JOB AND FAMILY SERVICES:  What they do: SNAP, provide a card to use like cash to purchase healthy foods at approved retailers  [facility]  Phone: [phone]  [facility]  Phone: [phone]  [facility]  Phone: [phone]  Website: [place].[facility]:  [address] [place] [address]  What they offer: Food and clothing distribution on Tuesdays from 9 am to 12pm & Thursdays from 4pm to 7pm.   Phone Number: [phone] ext. 503  Website: www.[employer]  [facility] Hand[employer]inder  What they offer: food items that stop at various housing and community services organizations, follow a month schedule.  Call to learn more.  Phone Number: [phone]      [facility] [employer]:  [address] [place] [address]  What they offer: Serves breakfast and lunch daily, 7 AM-9AM and 11AM-1PM (closed Sundays)  Contact: [phone]  [facility] [employer]: [address] [place] [address]  What they offer: Hot evening meals on Mondays and Tuesdays; doors open at 4:00PM; dinner served 5PM-6PM  Phone Number: [phone]  Website: [employer]    [facility] [employer] [employer]:  What they offer: Emergency food assistance  Phone number: [phone]  Website: [employer]  [employer]:  What they offer: Frozen meals for private pay, government funded programs and some insurances   Phone Number: [phone]  Website: [employer]  [employer] ([facility]):  What they offer: Hot dinner and cold lunch Monday-Friday ($14/day); hot dinner Monday-Friday

## 2024-09-04 DIAGNOSIS — M85.80 OSTEOPENIA, UNSPECIFIED LOCATION: ICD-10-CM

## 2024-09-04 RX ORDER — CALCIUM CARBONATE 10GR (648 MG) 648 MG/1
1 TABLET ORAL 2 TIMES DAILY
Qty: 180 TABLET | Refills: 1 | Status: SHIPPED | OUTPATIENT
Start: 2024-09-04

## 2024-09-04 NOTE — TELEPHONE ENCOUNTER
Patient and/or insurance is requesting a 90 day supply of medication instead of 30 days w/refills due to cost effectiveness.

## 2024-11-20 ENCOUNTER — CLINICAL DOCUMENTATION (OUTPATIENT)
Dept: SPIRITUAL SERVICES | Age: 64
End: 2024-11-20

## 2024-11-20 NOTE — ACP (ADVANCE CARE PLANNING)
Advance Care Planning   Ambulatory ACP Specialist Patient Outreach    Date:  11/20/2024    ACP Specialist:  Virgie Santoro LPN    Outreach call to patient in follow-up to ACP Specialist referral from:Tamara Shelton MD    [x] PCP  [] Provider   [] Ambulatory Care Management [] Other     For:                  [x] Advance Directive Assistance              [] Complete Portable DNR order              [] Complete POST/POLST/MOST              [x] Code Status Discussion             [x] Discuss Goals of Care             [x] Early ACP Decision-Making              [] Other (Specify)    Date Referral Received:08/13/24    Next Step:   [x] ACP scheduled conversation  [] Outreach again in one week               [x] Email / Mail ACP Info Sheets  [x] Email / Mail Advance Directive   [] Closing referral.  Routing closure to referring provider/staff and to ACP Specialist .    [] Closure letter mailed to patient with invitation to contact ACP Specialist if / when ready.   [] Other (Specify here):       [x] At this time, Healthcare Decision Maker Is:         [] Primary agent named in scanned advance directive.    [] Legal Next of Kin.     [x] Unable to determine legal decision maker at this time.    Outreaches:       [x] 1st -  Date:  11/20/24               Intervention:  [x] Spoke with Patient   [] Left Voice mail [] Email / Mail    [] [email or web address]  [] Other (Specify) :     Outcomes:Writer contacted patient on home/mobile phone ([phone]) to discuss ACP.  Patient is agreeable to a conversation with ACP Specialist Zuly Sorto on Wednesday, December 4, 2024 at 9:00 a.m.  A copy of [place] [employer] forms and ACP information sheets sent to patient's confirmed home address on file with ACP Specialist and Coordinator's contact information included.       Thank you for this referral.

## 2024-12-03 ENCOUNTER — CLINICAL DOCUMENTATION (OUTPATIENT)
Dept: SPIRITUAL SERVICES | Age: 64
End: 2024-12-03

## 2024-12-03 NOTE — ACP (ADVANCE CARE PLANNING)
Advance Care Planning   Ambulatory ACP Specialist Patient Outreach    Date:  12/3/2024    ACP Specialist:  ELAINE Hernandez    Outreach call to patient in follow-up to ACP Specialist referral from:Tamara Shelton MD    [x] PCP  [] Provider   [] Ambulatory Care Management [] Other     For:                  [x] Advance Directive Assistance              [] Complete Portable DNR order              [] Complete POST/POLST/MOST              [x] Code Status Discussion             [x] Discuss Goals of Care             [x] Early ACP Decision-Making              [x] Other (Specify): ACP docs and living will.  /  Date Referral Received:08/13/2024    Next Step:   [] ACP scheduled conversation  [] Outreach again in one week               [] Email / Mail ACP Info Sheets  [] Email / Mail Advance Directive   [] Closing referral.  Routing closure to referring provider/staff and to ACP Specialist .    [] Closure letter mailed to patient with invitation to contact ACP Specialist if / when ready.   [] Other (Specify here):       [x] At this time, Healthcare Decision Maker Is:         [] Primary agent named in /scanned advance directive.    [] Legal Next of Kin.     [x] Unable to determine legal decision maker at this time.    Outreaches:          [x]  Additional Outreach -  Date:  12/03/2024   (Specify Dates & special circumstances):    Outcomes: Reminder call placed to home/mobile [phone] (H)  re: ACP visit set for Wed Dec 4 at 9am. Pt answered and confirmed. Pt confirmed receipt of ACP packet. Will continue to follow.     Thank you for this referral.

## 2024-12-04 ENCOUNTER — CLINICAL DOCUMENTATION (OUTPATIENT)
Dept: SPIRITUAL SERVICES | Age: 64
End: 2024-12-04

## 2024-12-04 NOTE — ACP (ADVANCE CARE PLANNING)
Advance Care Planning    Advance Care Planning Clinical Specialist  Conversation Note    Date of ACP Conversation: 12/04/24     ACP Clinical Specialist: ELAINE Hernandez    Conversation Conducted with: Patient with decision making capacity      Current Designated Decision Maker/s:    Primary Decision Maker: Artemio Sanchez - Child - [phone]         Care Preferences Communicated    Code Status:  If the patient's heart were to stop beating, in their present state of health, the patient's CPR Preference: No, allow a natural death (No CPR) and pt is planning to take DNR form to PCP --pt wishes for DNRCCA.    If their health worsens and it becomes clear that the chance of recovery is unlikely, the patient's CPR Preference: No, allow a natural death (No CPR)     Ventilator:  If the patient, in their present state of health, suddenly became very ill and unable to breathe on their own,  pt shared she wants \"the minimal\" and is preparing a DNRCCCA for herself.     If their health worsens and it becomes clear that the chance of recovery is unlikely, the patient does not desire the use of a ventilator (intubation).    [x] Yes  [] No   Educated Patient / Decision Maker regarding differences between advance directives and portable DNR orders.    Conversation Summary:   Met with pt to offer ACP support. Explored pt understanding and wishes for the future. Pt shared she would prefer \"if EMS was never called to my home.\" Pt shared \"my kids won't have that.\" Pt does not wish for cpr, ventilation and stated \"I wouldn't even want an IV if I could help it.\" Offered supportive listening and encouraged pt to continue to talk with her children about her wishes. Explained there is not document that could prevent her children or anyone else from calling EMS to her home. Did explain that if pt is alert and oriented at the time-of course she may refuse treatment but that having wishes documented is best practice. Assisted pt with

## 2025-01-26 DIAGNOSIS — M54.42 CHRONIC MIDLINE LOW BACK PAIN WITH LEFT-SIDED SCIATICA: ICD-10-CM

## 2025-01-26 DIAGNOSIS — G89.29 CHRONIC MIDLINE LOW BACK PAIN WITH LEFT-SIDED SCIATICA: ICD-10-CM

## 2025-01-27 RX ORDER — BACLOFEN 20 MG/1
TABLET ORAL
Qty: 90 TABLET | Refills: 1 | Status: SHIPPED | OUTPATIENT
Start: 2025-01-27

## 2025-01-27 NOTE — TELEPHONE ENCOUNTER
Name of Medication(s) Requested:  Requested Prescriptions     Pending Prescriptions Disp Refills    baclofen (LIORESAL) 20 MG tablet [Pharmacy Med Name: BACLOFEN 20 MG TABLET] 90 tablet 1     Sig: TAKE 1 TABLET BY MOUTH EVERY NIGHT AS NEEDED FOR BACK PAIN       Medication is on current medication list Yes    Dosage and directions were verified? Yes    Quantity verified: 90 day supply     Pharmacy Verified?  Yes    Last Appointment:  8/13/2024    Future appts:  No future appointments.   Return in about 1 year (around 8/13/2025) for AWV visit.    (If no appt send self scheduling link. .REFILLAPPT)  Scheduling request sent?     [] Yes  [x] No    Does patient need updated?  [] Yes  [x] No

## 2025-02-09 DIAGNOSIS — K21.00 GASTROESOPHAGEAL REFLUX DISEASE WITH ESOPHAGITIS WITHOUT HEMORRHAGE: ICD-10-CM

## 2025-02-10 RX ORDER — FAMOTIDINE 20 MG/1
TABLET, FILM COATED ORAL
Qty: 180 TABLET | Refills: 1 | Status: SHIPPED | OUTPATIENT
Start: 2025-02-10

## 2025-02-10 NOTE — TELEPHONE ENCOUNTER
Name of Medication(s) Requested:  Requested Prescriptions     Pending Prescriptions Disp Refills    famotidine (PEPCID) 20 MG tablet [Pharmacy Med Name: FAMOTIDINE 20 MG TABLET] 180 tablet 1     Sig: TAKE 1 TABLET BY MOUTH TWICE A DAY       Medication is on current medication list Yes    Dosage and directions were verified? Yes    Quantity verified: 90 day supply     Pharmacy Verified?  Yes    Last Appointment:  8/13/2024    Future appts:  No future appointments.     (If no appt send self scheduling link. .REFILLAPPT)  Scheduling request sent?     [] Yes  [x] No    Does patient need updated?  [] Yes  [x] No

## 2025-07-10 DIAGNOSIS — M54.42 CHRONIC MIDLINE LOW BACK PAIN WITH LEFT-SIDED SCIATICA: ICD-10-CM

## 2025-07-10 DIAGNOSIS — G89.29 CHRONIC MIDLINE LOW BACK PAIN WITH LEFT-SIDED SCIATICA: ICD-10-CM

## 2025-07-10 RX ORDER — BACLOFEN 20 MG/1
TABLET ORAL
Qty: 90 TABLET | Refills: 1 | Status: SHIPPED | OUTPATIENT
Start: 2025-07-10

## 2025-07-10 NOTE — TELEPHONE ENCOUNTER
Name of Medication(s) Requested:  Requested Prescriptions     Pending Prescriptions Disp Refills    baclofen (LIORESAL) 20 MG tablet [Pharmacy Med Name: BACLOFEN 20 MG TABLET] 90 tablet 1     Sig: TAKE 1 TABLET BY MOUTH EVERY NIGHT AS NEEDED FOR BACK PAIN       Medication is on current medication list Yes    Dosage and directions were verified? Yes    Quantity verified: 90 day supply     Pharmacy Verified?  Yes    Last Appointment:  8/13/2024    Future appts:  No future appointments.     (If no appt send self scheduling link. .REFILLAPPT)  Scheduling request sent?     [] Yes  [x] No    Does patient need updated?  [] Yes  [x] No